# Patient Record
Sex: FEMALE | Race: WHITE
[De-identification: names, ages, dates, MRNs, and addresses within clinical notes are randomized per-mention and may not be internally consistent; named-entity substitution may affect disease eponyms.]

---

## 2019-02-10 NOTE — EDM.PDOC
ED HPI GENERAL MEDICAL PROBLEM





- General


Chief Complaint: Genitourinary Problem


Stated Complaint: BLADDER PROBLEMS


Time Seen by Provider: 02/10/19 12:38


Source of Information: Reports: Patient


History Limitations: Reports: No Limitations





- History of Present Illness


INITIAL COMMENTS - FREE TEXT/NARRATIVE: 


History of present illness:


[]Patient self catheterizes her bladder and has noticed that she is having less 

urine production, there is no color change or blood in her urine.. Patient also 

states she is drinking less fluids. Patient denies any abdominal pain, fevers, 

chills, vomiting or diarrhea. She states she has noticed she started having low 

back pain bilaterally yesterday.





Review of systems: 


As per history of present illness and below otherwise all systems reviewed and 

negative.





Past medical history: 


As per history of present illness and as reviewed below otherwise 

noncontributory.





Surgical history: 


As per history of present illness and as reviewed below otherwise 

noncontributory.





Social history: 


No reported history of drug or alcohol abuse.





Family history: 


As per history of present illness and as reviewed below otherwise 

noncontributory.





Physical exam:


General: Well developed, well nourished in NAD


HEENT: Atraumatic, normocephalic, pupils reactive, negative for conjunctival 

pallor or scleral icterus, mucous membranes moist, throat clear, neck supple, 

nontender, trachea midline.


Lungs: Clear to auscultation, breath sounds equal bilaterally, chest nontender.


Heart: S1S2, regular, negative for clicks, rubs, or JVD.


Abdomen: NABS, Soft, nondistended, nontender. Negative for masses or 

hepatosplenomegaly. Negative for costovertebral tenderness.


Pelvis: Stable nontender.


Genitourinary: Deferred.


Rectal: Deferred.


Extremities: Atraumatic, negative for cords or calf pain. Neurovascular 

unremarkable.


Neuro: Awake, alert, oriented. Cranial nerves II through XII unremarkable. 

Cerebellum unremarkable. Motor and sensory unremarkable throughout. Exam 

nonfocal.


Skin:warm and dry





Diagnostics:


Bladder scan shows 173 mmol, UA positive for UTI





Therapeutics:


None





ED Course:


Unremarkable





Impression: 


UTI





Prescriptions:


Bactrim





Plan:


Increase fluids take meds as directed follow-up with primary care.





Definitive disposition and diagnosis as appropriate pending reevaluation and 

review of above.





  ** Lower Back


Pain Score (Numeric/FACES): 4





- Related Data


 Allergies











Allergy/AdvReac Type Severity Reaction Status Date / Time


 


codeine Allergy  Nausea and Verified 02/10/19 13:15





   Vomiting  











Home Meds: 


 Home Meds





Carisoprodol 350 mg PO QID PRN 05/24/14 [History]


Diclofenac Sodium/Misoprostol [Diclofenac-Misoprost  Tb] 1 tab PO BID 05/ 24/14 [History]


Ketorolac Tromethamine [Acular] 1 drop EYERT QID 05/24/14 [History]


Moxifloxacin [Vigamox 0.5% Ophth Soln] 1 drop EYERT QID 05/24/14 [History]


Ramipril [Altace] 5 mg PO DAILY 05/24/14 [History]


prednisoLONE acetate [Pred Forte 1% Ophth Susp] 1 drop EYERT QID 05/24/14 [

History]


hydroCHLOROthiazide [Hydrochlorothiazide] 25 mg PO DAILY #30 tablet 05/25/14 [Rx

]


Sulfamethoxazole/Trimethoprim [Bactrim Ds Tablet] 1 each PO BID #20 tablet 02/10

/19 [Rx]











Past Medical History


Cardiovascular History: Reports: Blood Clots/VTE/DVT, Hypertension


Genitourinary History: Reports: Retention, Urinary





- Infectious Disease History


Infectious Disease History: Reports: Chicken Pox, Measles, Mumps





- Past Surgical History


Female  Surgical History: Reports: Hysterectomy





Social & Family History





- Family History


Family Medical History: Noncontributory





- Tobacco Use


Smoking Status *Q: Former Smoker


Used Tobacco, but Quit: Yes


Month/Year Tobacco Last Used: 2 years





- Recreational Drug Use


Recreational Drug Use: No





ED ROS GENERAL





- Review of Systems


Review Of Systems: ROS reveals no pertinent complaints other than HPI.





ED EXAM, RENAL/





- Physical Exam


Exam: See Below (The history of present illness)





Course





- Vital Signs


Last Recorded V/S: 


 Last Vital Signs











Temp  98.0 F   02/10/19 13:12


 


Pulse  51 L  02/10/19 13:12


 


Resp  16   02/10/19 13:12


 


BP  169/41 H  02/10/19 13:12


 


Pulse Ox  99   02/10/19 13:12














- Orders/Labs/Meds


Orders: 


 Active Orders 24 hr











 Category Date Time Status


 


 Bladder Scan [RC] ASDIRECTED Care  02/10/19 13:37 Active


 


 CULTURE URINE [RM] Routine Lab  02/10/19 14:00 Received











Labs: 


 Laboratory Tests











  02/10/19 Range/Units





  14:00 


 


Urine Color  YELLOW  


 


Urine Appearance  CLEAR  


 


Urine pH  5.5  (5.0-8.0)  


 


Ur Specific Gravity  1.015  (1.001-1.035)  


 


Urine Protein  NEGATIVE  (NEGATIVE)  mg/dL


 


Urine Glucose (UA)  NEGATIVE  (NEGATIVE)  mg/dL


 


Urine Ketones  NEGATIVE  (NEGATIVE)  mg/dL


 


Urine Occult Blood  NEGATIVE  (NEGATIVE)  


 


Urine Nitrite  POSITIVE H  (NEGATIVE)  


 


Urine Bilirubin  NEGATIVE  (NEGATIVE)  


 


Urine Urobilinogen  0.2  (<2.0)  EU/dL


 


Ur Leukocyte Esterase  SMALL H  (NEGATIVE)  


 


Urine RBC  1-3  (0-2/HPF)  


 


Urine WBC  4-16  (0-5/HPF)  


 


Ur Epithelial Cells  RARE  (NONE-FEW)  


 


Urine Bacteria  2+ H  (NEGATIVE)  


 


Urine Mucus  RARE  (NONE-MOD)  














Departure





- Departure


Time of Disposition: 14:23


Disposition: Home, Self-Care 01


Condition: Good


Clinical Impression: 


UTI (urinary tract infection)


Qualifiers:


 Urinary tract infection type: site unspecified Hematuria presence: without 

hematuria Qualified Code(s): N39.0 - Urinary tract infection, site not specified








- Discharge Information


*PRESCRIPTION DRUG MONITORING PROGRAM REVIEWED*: No


*COPY OF PRESCRIPTION DRUG MONITORING REPORT IN PATIENT RACHNA: No


Prescriptions: 


Sulfamethoxazole/Trimethoprim [Bactrim Ds Tablet] 1 each PO BID #20 tablet


Referrals: 


PCP,Unknown [Primary Care Provider] - 


Forms:  ED Department Discharge


Additional Instructions: 


The following information is given to patients seen in the emergency department 

who are being discharged to home. This information is to outline your options 

for follow-up care. We provide all patients seen in our emergency department 

with a follow-up referral.





The need for follow-up, as well as the timing and circumstances, are variable 

depending upon the specifics of your emergency department visit.





If you don't have a primary care physician on staff, we will provide you with a 

referral. We always advise you to contact your personal physician following an 

emergency department visit to inform them of the circumstance of the visit and 

for follow-up with them and/or the need for any referrals to a consulting 

specialist.





The emergency department will also refer you to a specialist when appropriate. 

This referral assures that you have the opportunity for follow-up care with a 

specialist. All of these measure are taken in an effort to provide you with 

optimal care, which includes your follow-up.





Under all circumstances we always encourage you to contact your private 

physician who remains a resource for coordinating your care. When calling for 

follow-up care, please make the office aware that this follow-up is from your 

recent emergency room visit. If for any reason you are refused follow-up, 

please contact the [facility] Emergency 

Department at [phone] and asked to speak to the emergency department 

charge nurse.





Take meds as directed, increase fluids and follow-up with primary care as 

needed.





[facility]


Primary Care


[address] [place] [id number]


Phone: [phone]


Fax: [phone]





- My Orders


Last 24 Hours: 


My Active Orders





02/10/19 13:37


Bladder Scan [RC] ASDIRECTED 





02/10/19 14:00


CULTURE URINE [RM] Routine 














- Assessment/Plan


Last 24 Hours: 


My Active Orders





02/10/19 13:37


Bladder Scan [RC] ASDIRECTED 





02/10/19 14:00


CULTURE URINE [RM] Routine

## 2019-03-23 NOTE — US
CLINICAL HISTORY:



 Right leg pain 



TECHNIQUE:



A compression venous ultrasound exam was performed of the right lower 

extremity using gray-scale imaging, color Doppler and spectral Doppler 

analysis.



FINDINGS:



Sonographic imaging of the right lower extremity demonstrates normal 

compressibility and color Doppler venous blood flow within the common 

femoral vein, deep femoral vein, and the proximal greater saphenous vein. 

Within the thigh,  the proximal mid femoral vein is patent and 

compressible. 



There is a short segment of the right distal femoral vein that is 

noncompressible and has suggestion of some fibrous band within the lumen. 

Duplicated distal femoral vein is present. 



At the lower level, the popliteal and posterior tibial veins also show 

normal compressibility and color Doppler venous blood flow.



Limited imaging of the contralateral groin demonstrates a normal spectral 

waveform and color Doppler venous blood flow within the left common femoral 

vein. 



IMPRESSION:



1. Short segment thrombus within the right distal femoral vein which 

suggestion of fibrous bands within the lumen. Findings suggest possible 

chronic DVT. 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 



There is otherwise no findings for thrombus within the remainder of the 

deep venous system within the right leg. Results called to Dr. Melgoza on 

03/23/2019 at 3:10 p.m.



Dictated by Whitley Stover MD @ Mar 23 2019  3:00PM



Signed by Dr. Whitley Stover @ Mar 23 2019  3:10PM

## 2019-03-23 NOTE — EDM.PDOC
ED HPI GENERAL MEDICAL PROBLEM





- General


Chief Complaint: Lower Extremity Injury/Pain


Stated Complaint: PAIN IN RT LEG


Time Seen by Provider: 03/23/19 13:15


Source of Information: Reports: Patient


History Limitations: Reports: No Limitations





- History of Present Illness


INITIAL COMMENTS - FREE TEXT/NARRATIVE: 





HISTORY AND PHYSICAL:





History of present illness:


Patient is a 78-year-old female here with complaint of right leg pain. Patient 

states that she started having some throbbing just below the right knee today. 

She reports she has "multiple stents all over her body" due to blood clots. She 

states it really only bothers her when she is healing her feet over the edge of 

the couch or bed. She normally uses a cane to ambulate and did walk in to ED 

putting full weight on the right side. She denies any injury or trauma to the 

leg. She denies chest pain, shortness of breath, fevers, chills, nausea, 

vomiting, abdominal pain. Patient is on plavix for previous DVTs. 





Review of systems: 


As per history of present illness and below otherwise all systems reviewed and 

negative.





Past medical history: 


As per history of present illness and as reviewed below otherwise 

noncontributory.





Surgical history: 


As per history of present illness and as reviewed below otherwise 

noncontributory.





Social history: 


No reported history of drug or alcohol abuse.





Family history: 


As per history of present illness and as reviewed below otherwise 

noncontributory.





Physical exam:


General: Patient sitting comfortably in no acute distress and nontoxic appearing


HEENT: Atraumatic, normocephalic, pupils reactive, negative for conjunctival 

pallor or scleral icterus, mucous membranes moist, throat clear, neck supple, 

nontender, trachea midline. No meningeal signs. 


Lungs: Clear to auscultation, breath sounds equal bilaterally, chest nontender.


Heart: S1S2, regular, negative for clicks, rubs, or overt murmur.


Abdomen: Soft, nondistended, nontender. Negative for masses or 

hepatosplenomegaly. Negative for costovertebral tenderness. No rigidity, rebound

, guarding.


Pelvis: Stable nontender.


Genitourinary: Deferred.


Rectal: Deferred.


Extremities: There is no erythema, warmth, swelling or pain to palpation of the 

leg. Atraumatic, negative for cords or calf pain. Neurovascular unremarkable.


Neuro: Awake, alert, oriented. Cranial nerves II through XII unremarkable. 

Cerebellum unremarkable. Motor and sensory unremarkable throughout. Exam 

nonfocal.





Notes: 





Diagnostics:


Right venous doppler US 





Therapeutics:


None 





Prescriptions:


None 





Impression: 


Chronic DVT right femoral vein 





Plan:


1. Continue current medications as prescribed


2. Follow up with primary care provider


3. Return to ED as needed as discussed 





Definitive disposition and diagnosis as appropriate pending reevaluation and 

review of above.





  ** Right leg


Pain Score (Numeric/FACES): 8





- Related Data


 Allergies











Allergy/AdvReac Type Severity Reaction Status Date / Time


 


codeine Allergy  Nausea and Verified 03/23/19 13:37





   Vomiting  











Home Meds: 


 Home Meds





Carisoprodol 350 mg PO QID PRN 05/24/14 [History]


Diclofenac Sodium/Misoprostol [Diclofenac-Misoprost  Tb] 1 tab PO BID 05/ 24/14 [History]


Ketorolac Tromethamine [Acular] 1 drop EYERT QID 05/24/14 [History]


Moxifloxacin [Vigamox 0.5% Ophth Soln] 1 drop EYERT QID 05/24/14 [History]


Ramipril [Altace] 5 mg PO DAILY 05/24/14 [History]


prednisoLONE acetate [Pred Forte 1% Ophth Susp] 1 drop EYERT QID 05/24/14 [

History]


hydroCHLOROthiazide [Hydrochlorothiazide] 25 mg PO DAILY #30 tablet 05/25/14 [Rx

]


Sulfamethoxazole/Trimethoprim [Bactrim Ds Tablet] 1 each PO BID #20 tablet 02/10

/19 [Rx]











Past Medical History


Cardiovascular History: Reports: Blood Clots/VTE/DVT, Hypertension


Genitourinary History: Reports: Retention, Urinary





- Infectious Disease History


Infectious Disease History: Reports: Chicken Pox, Measles, Mumps





- Past Surgical History


Female  Surgical History: Reports: Hysterectomy





Social & Family History





- Family History


Family Medical History: Noncontributory





Review of Systems





- Review of Systems


Review Of Systems: ROS reveals no pertinent complaints other than HPI.





ED EXAM, GENERAL





- Physical Exam


Exam: See Below (see dictation)





Course





- Vital Signs


Last Recorded V/S: 


 Last Vital Signs











Temp  96.4 F   03/23/19 13:38


 


Pulse  79   03/23/19 13:38


 


Resp  17   03/23/19 13:38


 


BP  129/50 L  03/23/19 13:38


 


Pulse Ox  98   03/23/19 13:38














Departure





- Departure


Time of Disposition: 15:19


Disposition: Home, Self-Care 01


Condition: Good


Clinical Impression: 


 Chronic deep vein thrombosis (DVT)








- Discharge Information


Referrals: 


Valerie Garcia MD [Primary Care Provider] - 


Forms:  ED Department Discharge


Additional Instructions: 


The following information is given to patients seen in the emergency department 

who are being discharged to home. This information is to outline your options 

for follow-up care. We provide all patients seen in our emergency department 

with a follow-up referral.





The need for follow-up, as well as the timing and circumstances, are variable 

depending upon the specifics of your emergency department visit.





If you don't have a primary care physician on staff, we will provide you with a 

referral. We always advise you to contact your personal physician following an 

emergency department visit to inform them of the circumstance of the visit and 

for follow-up with them and/or the need for any referrals to a consulting 

specialist.





The emergency department will also refer you to a specialist when appropriate. 

This referral assures that you have the opportunity for follow-up care with a 

specialist. All of these measure are taken in an effort to provide you with 

optimal care, which includes your follow-up.





Under all circumstances we always encourage you to contact your private 

physician who remains a resource for coordinating your care. When calling for 

follow-up care, please make the office aware that this follow-up is from your 

recent emergency room visit. If for any reason you are refused follow-up, 

please contact the [facility] Emergency 

Department at [phone] and asked to speak to the emergency department 

charge nurse.





[facility]


Primary Care


[address] [place] [address]


Phone: [phone]


Fax: [phone]





[address] [address]


Phone: [phone]


Fax: [phone]





1. Continue current medications as prescribed


2. Follow up with primary care provider


3. Return to ED as needed as discussed

## 2019-04-16 ENCOUNTER — HOSPITAL ENCOUNTER (OUTPATIENT)
Dept: HOSPITAL 56 - MW.ED | Age: 79
Setting detail: OBSERVATION
LOS: 1 days | Discharge: HOME HEALTH | End: 2019-04-17
Attending: INTERNAL MEDICINE | Admitting: INTERNAL MEDICINE
Payer: MEDICARE

## 2019-04-16 DIAGNOSIS — I12.9: ICD-10-CM

## 2019-04-16 DIAGNOSIS — R55: Primary | ICD-10-CM

## 2019-04-16 DIAGNOSIS — E86.0: ICD-10-CM

## 2019-04-16 DIAGNOSIS — N17.9: ICD-10-CM

## 2019-04-16 DIAGNOSIS — Z86.718: ICD-10-CM

## 2019-04-16 DIAGNOSIS — S09.90XA: ICD-10-CM

## 2019-04-16 DIAGNOSIS — E11.22: ICD-10-CM

## 2019-04-16 DIAGNOSIS — Z87.891: ICD-10-CM
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
## 2019-04-16 DIAGNOSIS — R33.9: ICD-10-CM

## 2019-04-16 DIAGNOSIS — Z79.01: ICD-10-CM

## 2019-04-16 DIAGNOSIS — N18.9: ICD-10-CM

## 2019-04-16 DIAGNOSIS — Z79.82: ICD-10-CM

## 2019-04-16 DIAGNOSIS — I48.2: ICD-10-CM

## 2019-04-16 DIAGNOSIS — Z79.899: ICD-10-CM

## 2019-04-16 DIAGNOSIS — D63.1: ICD-10-CM

## 2019-04-16 LAB
CHLORIDE SERPL-SCNC: 107 MMOL/L (ref 98–107)
SODIUM SERPL-SCNC: 140 MMOL/L (ref 136–145)

## 2019-04-16 PROCEDURE — 71045 X-RAY EXAM CHEST 1 VIEW: CPT

## 2019-04-16 PROCEDURE — 70450 CT HEAD/BRAIN W/O DYE: CPT

## 2019-04-16 PROCEDURE — 36415 COLL VENOUS BLD VENIPUNCTURE: CPT

## 2019-04-16 PROCEDURE — G0378 HOSPITAL OBSERVATION PER HR: HCPCS

## 2019-04-16 PROCEDURE — 85610 PROTHROMBIN TIME: CPT

## 2019-04-16 PROCEDURE — 80053 COMPREHEN METABOLIC PANEL: CPT

## 2019-04-16 PROCEDURE — 82550 ASSAY OF CK (CPK): CPT

## 2019-04-16 PROCEDURE — 85025 COMPLETE CBC W/AUTO DIFF WBC: CPT

## 2019-04-16 PROCEDURE — 96360 HYDRATION IV INFUSION INIT: CPT

## 2019-04-16 PROCEDURE — 99285 EMERGENCY DEPT VISIT HI MDM: CPT

## 2019-04-16 PROCEDURE — 85018 HEMOGLOBIN: CPT

## 2019-04-16 PROCEDURE — 72170 X-RAY EXAM OF PELVIS: CPT

## 2019-04-16 PROCEDURE — 80305 DRUG TEST PRSMV DIR OPT OBS: CPT

## 2019-04-16 PROCEDURE — G0390 TRAUMA RESPONS W/HOSP CRITI: HCPCS

## 2019-04-16 PROCEDURE — 93005 ELECTROCARDIOGRAM TRACING: CPT

## 2019-04-16 PROCEDURE — 83550 IRON BINDING TEST: CPT

## 2019-04-16 PROCEDURE — 82607 VITAMIN B-12: CPT

## 2019-04-16 PROCEDURE — 87186 SC STD MICRODIL/AGAR DIL: CPT

## 2019-04-16 PROCEDURE — 82746 ASSAY OF FOLIC ACID SERUM: CPT

## 2019-04-16 PROCEDURE — 82272 OCCULT BLD FECES 1-3 TESTS: CPT

## 2019-04-16 PROCEDURE — 85014 HEMATOCRIT: CPT

## 2019-04-16 PROCEDURE — 87088 URINE BACTERIA CULTURE: CPT

## 2019-04-16 PROCEDURE — 80048 BASIC METABOLIC PNL TOTAL CA: CPT

## 2019-04-16 PROCEDURE — 82728 ASSAY OF FERRITIN: CPT

## 2019-04-16 PROCEDURE — 87086 URINE CULTURE/COLONY COUNT: CPT

## 2019-04-16 PROCEDURE — 85045 AUTOMATED RETICULOCYTE COUNT: CPT

## 2019-04-16 PROCEDURE — 84484 ASSAY OF TROPONIN QUANT: CPT

## 2019-04-16 PROCEDURE — 51702 INSERT TEMP BLADDER CATH: CPT

## 2019-04-16 PROCEDURE — 81001 URINALYSIS AUTO W/SCOPE: CPT

## 2019-04-16 PROCEDURE — 96361 HYDRATE IV INFUSION ADD-ON: CPT

## 2019-04-16 PROCEDURE — 87040 BLOOD CULTURE FOR BACTERIA: CPT

## 2019-04-16 NOTE — CR
EXAMINATION: Portable chest radiograph.

 

HISTORY: Shortness of breath.

 

FINDINGS: 

The trachea is midline. The cardiomediastinal silhouette is within normal

limits. No pulmonary infiltrates, effusions or pneumothorax.

 

Osseous structures appear unremarkable.

 

IMPRESSION: 

No acute cardiopulmonary process.

## 2019-04-16 NOTE — CT
EXAMINATION:  Non contrast CT head. Coronal and sagittal reformats.

 

HISTORY: Pain

 

FINDINGS:  

No evidence of intra or extra axial hemorrhage, mass,  midline shift,

hydrocephalus or edema. Mild generalized atrophy. 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

lacunar infarct. Mild periventricular white matter hypodensities.

 

No hypoattenuation changes in the major vascular territories to suggest acute

infarct.  

 

No abnormal intracranial calcifications are detected. Mild vascular

calcifications.

 

Air-fluid levels are noted within the maxillary sinuses and right sphenoid

sinus. Left-sided estevan bullosa noted. Middle ears and mastoid air cells are

clear.

 

Pituitary fossa appears unremarkable. Orbits and globes are symmetric.

 

Calvarium is intact. No evidence of skull fracture. 

 

IMPRESSION: 

 

1. No acute intracranial findings.

2. Mild generalized atrophy and small vessel ischemic changes.

3. Air-fluid levels within the paranasal sinuses, correlate clinically for acute

sinusitis.

## 2019-04-16 NOTE — CR
EXAMINATION: Pelvis

 

HISTORY: Pain

 

COMPARISON: 8/2/2010

 

TECHNIQUE: AP view

 

FINDINGS: There is no acute osseous abnormality, dislocation, or fracture. Bone

mineralization is normal to mildly osteopenic. SI joints are symmetric with mild

osteophyte formation. Hip joint spaces are preserved. The iliopectineal and

ilioischial inguinal lines appear intact.

 

Postsurgical changes noted within the lower lumbar spine. Right common iliac

stent noted.

 

IMPRESSION: 

1. Degenerative changes and mild osteopenia without acute osseous abnormality.

## 2019-04-16 NOTE — EDM.PDOC
ED HPI GENERAL MEDICAL PROBLEM





- General


Stated Complaint: FALL


Time Seen by Provider: 04/16/19 13:38





- History of Present Illness


INITIAL COMMENTS - FREE TEXT/NARRATIVE: 


HISTORY AND PHYSICAL:





History of present illness:


Patient 78-year-old female with an extensive past medical history including 

reported atrial fibrillation was also on aspirin amongst other medications 

presents status post syncope in which he got up from the bathroom and lost 

consciousness she did hit her head she denies any other trauma concerned she 

denies chest pain palpitations fever chills nausea vomiting shortness of breath 

or any other complaints.





Review of systems: 


As per history of present illness and below otherwise all systems reviewed and 

negative.





Past medical history: 


As per history of present illness and as reviewed below otherwise 

noncontributory.





Surgical history: 


As per history of present illness and as reviewed below otherwise 

noncontributory.





Social history: 


No reported history of drug or alcohol abuse.





Family history: 


As per history of present illness and as reviewed below otherwise 

noncontributory.





Physical exam:


HEENT: Mild tenderness in the right occipital scalp no step-off no depression 

or crepitation a laceration normocephalic, pupils reactive, negative for 

conjunctival pallor or scleral icterus, mucous membranes moist, throat clear, 

neck supple, nontender, trachea midline.


Lungs: Clear to auscultation, breath sounds equal bilaterally, chest nontender.


Heart: S1S2, regular, negative for clicks, rubs, or JVD.


Abdomen: Soft, nondistended, nontender. Negative for masses or 

hepatosplenomegaly. Negative for costovertebral tenderness.


Pelvis: Stable nontender.


Genitourinary: Deferred.


Rectal: Deferred.


Extremities: Atraumatic, negative for cords or calf pain. Neurovascular 

unremarkable.


Neuro: Awake, alert, oriented. Follows commands and moves all extremities motor 

and sensory are grossly unremarkable limited but grossly nonfocal exam





Diagnostics:


CBC CMP troponin PT/INR chest x-ray EKG CT brain x-ray pelvis UA blood culture 

2 urine drug screen





Therapeutics:


IV O2 monitor





Impression: 


#1 syncope #2 fall with closed head trauma





Definitive disposition and diagnosis as appropriate pending reevaluation and 

review of above.








- Related Data


 Allergies











Allergy/AdvReac Type Severity Reaction Status Date / Time


 


codeine Allergy  Nausea and Verified 03/23/19 13:37





   Vomiting  











Home Meds: 


 Home Meds





Carisoprodol 350 mg PO QID PRN 05/24/14 [History]


Diclofenac Sodium/Misoprostol [Diclofenac-Misoprost  Tb] 1 tab PO BID 05/ 24/14 [History]


Ketorolac Tromethamine [Acular] 1 drop EYERT QID 05/24/14 [History]


Moxifloxacin [Vigamox 0.5% Ophth Soln] 1 drop EYERT QID 05/24/14 [History]


Ramipril [Altace] 5 mg PO DAILY 05/24/14 [History]


prednisoLONE acetate [Pred Forte 1% Ophth Susp] 1 drop EYERT QID 05/24/14 [

History]


hydroCHLOROthiazide [Hydrochlorothiazide] 25 mg PO DAILY #30 tablet 05/25/14 [Rx

]


Sulfamethoxazole/Trimethoprim [Bactrim Ds Tablet] 1 each PO BID #20 tablet 02/10

/19 [Rx]











Past Medical History





- Past Health History


Medical/Surgical History: Denies Medical/Surgical History


Cardiovascular History: Reports: Blood Clots/VTE/DVT, Hypertension


Genitourinary History: Reports: Retention, Urinary





- Infectious Disease History


Infectious Disease History: Reports: Chicken Pox, Measles, Mumps





- Past Surgical History


Female  Surgical History: Reports: Hysterectomy





Social & Family History





- Family History


Family Medical History: Noncontributory





- Caffeine Use


Caffeine Use: Reports: Soda, Tea





ED ROS GENERAL





- Review of Systems


Review Of Systems: ROS reveals no pertinent complaints other than HPI.





ED EXAM, GENERAL





- Physical Exam


Exam: See Below (See dictation)





Course





- Vital Signs


Last Recorded V/S: 


 Last Vital Signs











Temp  36.3 C   04/16/19 13:35


 


Pulse  80   04/16/19 15:00


 


Resp  16   04/16/19 15:00


 


BP  122/41 L  04/16/19 15:00


 


Pulse Ox  99   04/16/19 15:00














- Orders/Labs/Meds


Orders: 


 Active Orders 24 hr











 Category Date Time Status


 


 Admission Status [Patient Status] [ADT] Stat ADT  04/16/19 14:21 Active


 


 Cardiac Monitoring [RC] .AS DIRECTED Care  04/16/19 13:38 Active


 


 EKG Documentation Completion [RC] STAT Care  04/16/19 13:38 Active


 


 Oxygen Therapy, ED [RC] ASDIRECTED Care  04/16/19 13:38 Active


 


 Pulse Oximetry [RC] ASDIRECTED Care  04/16/19 13:38 Active


 


 CULTURE BLOOD [BC] Stat Lab  04/16/19 14:20 Received


 


 CULTURE BLOOD [BC] Stat Lab  04/16/19 14:28 Received


 


 CULTURE URINE [RM] Stat Lab  04/16/19 14:55 Received


 


 DRUG SCREEN, URINE [URCHEM] Stat Lab  04/16/19 14:55 Received


 


 UA W/MICROSCOPIC [URIN] Stat Lab  04/16/19 14:55 Results


 


 Sodium Chloride 0.9% [Normal Saline] 1,000 ml Med  04/16/19 13:45 Active





 IV STAT   


 


 Sodium Chloride 0.9% [Saline Flush] Med  04/16/19 13:41 Active





 10 ml FLUSH ASDIRECTED PRN   


 


 Sodium Chloride 0.9% [Saline Flush] Med  04/16/19 13:41 Active





 2.5 ml FLUSH ASDIRECTED PRN   


 


 Blood Culture x2 Reflex Set [OM.PC] Stat Oth  04/16/19 13:40 Ordered


 


 Saline Lock Insert [OM.PC] Stat Oth  04/16/19 13:38 Ordered








 Medication Orders





Sodium Chloride (Normal Saline)  1,000 mls @ 125 mls/hr IV STAT [facility]


   Last Admin: 04/16/19 14:10  Dose: 125 mls/hr


Sodium Chloride (Saline Flush)  10 ml FLUSH ASDIRECTED PRN


   PRN Reason: Keep Vein Open


   Last Admin: 04/16/19 14:10  Dose: 10 ml


Sodium Chloride (Saline Flush)  2.5 ml FLUSH ASDIRECTED PRN


   PRN Reason: Keep Vein Open


   Last Admin: 04/16/19 14:10  Dose: 2.5 ml








Labs: 


 Laboratory Tests











  04/16/19 04/16/19 04/16/19 Range/Units





  14:20 14:20 14:20 


 


WBC  11.97 H    (4.0-11.0)  K/uL


 


RBC  3.20 L    (4.30-5.90)  M/uL


 


Hgb  9.9 L    (12.0-16.0)  g/dL


 


Hct  30.6 L    (36.0-46.0)  %


 


MCV  95.6    (80.0-98.0)  fL


 


MCH  30.9    (27.0-32.0)  pg


 


MCHC  32.4    (31.0-37.0)  g/dL


 


RDW Std Deviation  52.0    (28.0-62.0)  fl


 


RDW Coeff of Charlee  15    (11.0-15.0)  %


 


Plt Count  427 H    (150-400)  K/uL


 


MPV  10.20    (7.40-12.00)  fL


 


Add Manual Diff  YES    


 


Neutrophils % (Manual)  77    (48.0-80.0)  %


 


Band Neutrophils %  6    %


 


Lymphocytes % (Manual)  9 L    (16.0-40.0)  %


 


Monocytes % (Manual)  5    (0.0-15.0)  %


 


Eosinophils % (Manual)  3    (0.0-7.0)  %


 


Nucleated RBC %  0.0    /100WBC


 


Absolute Seg Neuts  9.2 H    (1.4-5.7)  


 


Band Neutrophils #  0.7    


 


Lymphocytes # (Manual)  1.1    (0.6-2.4)  


 


Monocytes # (Manual)  0.6    (0.0-0.8)  


 


Eosinophils # (Manual)  0.4    (0.0-0.7)  


 


Nucleated RBCs #  0    K/uL


 


INR   1.27   


 


Sodium    140  (136-145)  mmol/L


 


Potassium    4.8  (3.5-5.1)  mmol/L


 


Chloride    107  ()  mmol/L


 


Carbon Dioxide    21.5  (21.0-32.0)  mmol/L


 


BUN    34 H  (7.0-18.0)  mg/dL


 


Creatinine    2.3 H  (0.6-1.0)  mg/dL


 


Est Cr Clr Drug Dosing    17.21  mL/min


 


Estimated GFR (MDRD)    20.6  ml/min


 


Glucose    125 H  ()  mg/dL


 


Calcium    9.7  (8.5-10.1)  mg/dL


 


Total Bilirubin    0.4  (0.2-1.0)  mg/dL


 


AST    23  (15-37)  IU/L


 


ALT    18  (14-63)  IU/L


 


Alkaline Phosphatase    48  ()  U/L


 


Troponin I    < 0.050  (0.000-0.056)  ng/mL


 


Total Protein    6.9  (6.4-8.2)  g/dL


 


Albumin    3.0 L  (3.4-5.0)  g/dL


 


Globulin    3.9  (2.6-4.0)  g/dL


 


Albumin/Globulin Ratio    0.8 L  (0.9-1.6)  


 


Urine Color     


 


Urine Appearance     


 


Urine pH     (5.0-8.0)  


 


Ur Specific Gravity     (1.001-1.035)  


 


Urine Protein     (NEGATIVE)  mg/dL


 


Urine Glucose (UA)     (NEGATIVE)  mg/dL


 


Urine Ketones     (NEGATIVE)  mg/dL


 


Urine Occult Blood     (NEGATIVE)  


 


Urine Nitrite     (NEGATIVE)  


 


Urine Bilirubin     (NEGATIVE)  


 


Urine Urobilinogen     (<2.0)  EU/dL


 


Ur Leukocyte Esterase     (NEGATIVE)  














  04/16/19 Range/Units





  14:55 


 


WBC   (4.0-11.0)  K/uL


 


RBC   (4.30-5.90)  M/uL


 


Hgb   (12.0-16.0)  g/dL


 


Hct   (36.0-46.0)  %


 


MCV   (80.0-98.0)  fL


 


MCH   (27.0-32.0)  pg


 


MCHC   (31.0-37.0)  g/dL


 


RDW Std Deviation   (28.0-62.0)  fl


 


RDW Coeff of Charlee   (11.0-15.0)  %


 


Plt Count   (150-400)  K/uL


 


MPV   (7.40-12.00)  fL


 


Add Manual Diff   


 


Neutrophils % (Manual)   (48.0-80.0)  %


 


Band Neutrophils %   %


 


Lymphocytes % (Manual)   (16.0-40.0)  %


 


Monocytes % (Manual)   (0.0-15.0)  %


 


Eosinophils % (Manual)   (0.0-7.0)  %


 


Nucleated RBC %   /100WBC


 


Absolute Seg Neuts   (1.4-5.7)  


 


Band Neutrophils #   


 


Lymphocytes # (Manual)   (0.6-2.4)  


 


Monocytes # (Manual)   (0.0-0.8)  


 


Eosinophils # (Manual)   (0.0-0.7)  


 


Nucleated RBCs #   K/uL


 


INR   


 


Sodium   (136-145)  mmol/L


 


Potassium   (3.5-5.1)  mmol/L


 


Chloride   ()  mmol/L


 


Carbon Dioxide   (21.0-32.0)  mmol/L


 


BUN   (7.0-18.0)  mg/dL


 


Creatinine   (0.6-1.0)  mg/dL


 


Est Cr Clr Drug Dosing   mL/min


 


Estimated GFR (MDRD)   ml/min


 


Glucose   ()  mg/dL


 


Calcium   (8.5-10.1)  mg/dL


 


Total Bilirubin   (0.2-1.0)  mg/dL


 


AST   (15-37)  IU/L


 


ALT   (14-63)  IU/L


 


Alkaline Phosphatase   ()  U/L


 


Troponin I   (0.000-0.056)  ng/mL


 


Total Protein   (6.4-8.2)  g/dL


 


Albumin   (3.4-5.0)  g/dL


 


Globulin   (2.6-4.0)  g/dL


 


Albumin/Globulin Ratio   (0.9-1.6)  


 


Urine Color  YELLOW  


 


Urine Appearance  CLEAR  


 


Urine pH  7.0  (5.0-8.0)  


 


Ur Specific Gravity  1.010  (1.001-1.035)  


 


Urine Protein  NEGATIVE  (NEGATIVE)  mg/dL


 


Urine Glucose (UA)  NEGATIVE  (NEGATIVE)  mg/dL


 


Urine Ketones  NEGATIVE  (NEGATIVE)  mg/dL


 


Urine Occult Blood  SMALL H  (NEGATIVE)  


 


Urine Nitrite  NEGATIVE  (NEGATIVE)  


 


Urine Bilirubin  NEGATIVE  (NEGATIVE)  


 


Urine Urobilinogen  0.2  (<2.0)  EU/dL


 


Ur Leukocyte Esterase  SMALL H  (NEGATIVE)  











Meds: 


Medications











Generic Name Dose Route Start Last Admin





  Trade Name Freq  PRN Reason Stop Dose Admin


 


Sodium Chloride  1,000 mls @ 125 mls/hr  04/16/19 13:45  04/16/19 14:10





  Normal Saline  IV   125 mls/hr





  STAT BALDO   Administration





     





     





     





     


 


Sodium Chloride  10 ml  04/16/19 13:41  04/16/19 14:10





  Saline Flush  FLUSH   10 ml





  ASDIRECTED PRN   Administration





  Keep Vein Open   





     





     





     


 


Sodium Chloride  2.5 ml  04/16/19 13:41  04/16/19 14:10





  Saline Flush  FLUSH   2.5 ml





  ASDIRECTED PRN   Administration





  Keep Vein Open   





     





     





     














Departure





- Departure


Time of Disposition: 15:13


Disposition: Refer to Observation


Condition: Good


Clinical Impression: 


 Syncope, Head injury








- Discharge Information





- My Orders


Last 24 Hours: 


My Active Orders





04/16/19 13:38


Cardiac Monitoring [RC] .AS DIRECTED 


EKG Documentation Completion [RC] STAT 


Oxygen Therapy, ED [RC] ASDIRECTED 


Pulse Oximetry [RC] ASDIRECTED 


Saline Lock Insert [OM.PC] Stat 





04/16/19 13:40


Blood Culture x2 Reflex Set [OM.PC] Stat 





04/16/19 13:41


Sodium Chloride 0.9% [Saline Flush]   10 ml FLUSH ASDIRECTED PRN 


Sodium Chloride 0.9% [Saline Flush]   2.5 ml FLUSH ASDIRECTED PRN 





04/16/19 13:45


Sodium Chloride 0.9% [Normal Saline] 1,000 ml IV STAT 





04/16/19 14:20


CULTURE BLOOD [BC] Stat 





04/16/19 14:21


Admission Status [Patient Status] [ADT] Stat 





04/16/19 14:28


CULTURE BLOOD [BC] Stat 





04/16/19 14:55


CULTURE URINE [RM] Stat 


DRUG SCREEN, URINE [URCHEM] Stat 


UA W/MICROSCOPIC [URIN] Stat 














- Assessment/Plan


Last 24 Hours: 


My Active Orders





04/16/19 13:38


Cardiac Monitoring [RC] .AS DIRECTED 


EKG Documentation Completion [RC] STAT 


Oxygen Therapy, ED [RC] ASDIRECTED 


Pulse Oximetry [RC] ASDIRECTED 


Saline Lock Insert [OM.PC] Stat 





04/16/19 13:40


Blood Culture x2 Reflex Set [OM.PC] Stat 





04/16/19 13:41


Sodium Chloride 0.9% [Saline Flush]   10 ml FLUSH ASDIRECTED PRN 


Sodium Chloride 0.9% [Saline Flush]   2.5 ml FLUSH ASDIRECTED PRN 





04/16/19 13:45


Sodium Chloride 0.9% [Normal Saline] 1,000 ml IV STAT 





04/16/19 14:20


CULTURE BLOOD [BC] Stat 





04/16/19 14:21


Admission Status [Patient Status] [ADT] Stat 





04/16/19 14:28


CULTURE BLOOD [BC] Stat 





04/16/19 14:55


CULTURE URINE [RM] Stat 


DRUG SCREEN, URINE [URCHEM] Stat 


UA W/MICROSCOPIC [URIN] Stat

## 2019-04-17 LAB
CHLORIDE SERPL-SCNC: 109 MMOL/L (ref 98–107)
SODIUM SERPL-SCNC: 140 MMOL/L (ref 136–145)

## 2019-04-17 NOTE — [id number]
Addendum entered and electronically signed by Roxy Zarate NP  04/17/19 16:51

: 








**Discharge Summary





- Hospital Course


Free Text/Narrative:: 





Marisa will be discharged home with Home Health. SHe is need of skilled care to 

help with medication administration as well as monitoring HR and BP. She would 

also benefit from PT and OT to evaluate home safety and to work with 

ambulationg and strengthening due to generalized weakness and deconditioning. 

She uses can and walker at home intermittently for ambulation and needs the 

assistance of these and care give to leave her house. Her PCP Dr Garcia will 

follow up with Plan of care with Home Health.  


Brief History: This 78 year old female with extensive history including Afib on 

anticoagulation, DVTs, HTN, and self caths presents to the ED via EMS after she 

laid in her bathtub overnight. She reports the last thing remembers was getting 

up to go to the bathroom. She reports she woke up in her bathtub and thinks she 

hit her head. She denies pain anywhere currently. She reports prior to this she 

was feeling well except for a little sinus congestion. She denies any headache 

or visual concerns, no lightheadedness or dizziness. She denies chest pain or 

palpitations. No shortness of breath or abdominal pain. She reports she is 

producing urine, she reports self cathing. She denies black or bloody BMs. No 

diarrhea or constipation. She lives alone, but her son comes to check on her.  

In the ED, mild leukocytosis noted 11,970, hgb 9.9, Platelets 427, BUN 34 and 

Cr 2.3. Troponin negative. Tox screen negative and UA neg. Head CT was obtained 

and reveals no acute intracranial findings, possible acute sinusitis. CXR 

negative and pelvix Xray negative. She will be admitted due to syncope.  PCP, 

Dr Garcia


Diagnosis: Stroke: No





- Discharge Data


Discharge Date: 04/17/19


Discharge Disposition: Home, W Home Health Agency 06


Condition: Good





- Discharge Diagnosis/Problem(s)


(1) Syncope


SNOMED Code(s): 996208760


   ICD Code: R55 - SYNCOPE AND COLLAPSE   Status: Acute   Current Visit: Yes   


Qualifiers: 


   Encounter type: initial encounter 





(2) Head injury


SNOMED Code(s): 48080956


   ICD Code: S09.90XA - UNSPECIFIED INJURY OF HEAD, INITIAL ENCOUNTER   Status: 

Acute   Current Visit: Yes   


Qualifiers: 


   Encounter type: initial encounter   Qualified Code(s): S09.90XA - 

Unspecified injury of head, initial encounter   





(3) HTN (hypertension)


SNOMED Code(s): 00293803


   ICD Code: I10 - ESSENTIAL (PRIMARY) HYPERTENSION   Status: Chronic   Current 

Visit: Yes   


Qualifiers: 


   Hypertension type: essential hypertension   Qualified Code(s): I10 - 

Essential (primary) hypertension   





(4) Afib


SNOMED Code(s): 32769394


   ICD Code: I48.91 - UNSPECIFIED ATRIAL FIBRILLATION   Status: Chronic   

Current Visit: Yes   





(5) Chronic anticoagulation


SNOMED Code(s): 947670145


   ICD Code: Z79.01 - LONG TERM (CURRENT) USE OF ANTICOAGULANTS   Status: 

Chronic   Current Visit: Yes   





(6) Urinary retention


SNOMED Code(s): 296737435


   ICD Code: R33.9 - RETENTION OF URINE, UNSPECIFIED   Status: Chronic   

Current Visit: Yes   





(7) Hx of deep venous thrombosis


SNOMED Code(s): 774151089


   ICD Code: Z86.718 - PERSONAL HISTORY OF OTHER VENOUS THROMBOSIS AND EMBOLISM

   Status: Chronic   Current Visit: Yes   





(8) Anemia


SNOMED Code(s): 991641237


   ICD Code: D64.9 - ANEMIA, UNSPECIFIED   Status: Acute   Current Visit: Yes   





(9) CKD (chronic kidney disease)


SNOMED Code(s): 830660168


   ICD Code: N18.9 - CHRONIC KIDNEY DISEASE, UNSPECIFIED   Status: Acute   

Current Visit: Yes   





- Patient Summary/Data


Consults: 


 Consultations





04/16/19 18:12


Consult to Physical Therapy [PT Evaluation and Treatment] [CONS] Routine 














- Patient Instructions


Diet: Heart Healthy Diet, Diabetic Diet


Activity: As Tolerated, No Strenuous Activities


Driving: Do Not Drive


Notify Provider of: Fever, Increased Pain, Swelling and Redness, Drainage, 

Nausea and/or Vomiting





- Discharge Plan


*PRESCRIPTION DRUG MONITORING PROGRAM REVIEWED*: Not Applicable


*COPY OF PRESCRIPTION DRUG MONITORING REPORT IN PATIENT RACHNA: Not Applicable


Prescriptions/Med Rec: 


Iron Ps Cmplx/Vit B12/Fa [Poly-Iron 150 Forte] 1 each PO DAILY #30 capsule


Home Medications: 


 Home Meds





Chlorthalidone 25 mg PO DAILY 04/16/19 [History]


Fenofibrate 145 mg PO DAILY 04/16/19 [History]


Metaxalone [Skelaxin] 800 mg PO BEDTIME 04/16/19 [History]


Metoprolol Succinate [Kapspargo Sprinkle] 25 mg PO DAILY 04/16/19 [History]


Potassium Chloride 10 meq PO DAILY 04/16/19 [History]


Ramipril [Altace] 5 mg PO DAILY 04/16/19 [History]


Rosuvastatin Calcium 20 mg PO DAILY 04/16/19 [History]


Zolpidem Tartrate [Edluar] 1 tab PO BEDTIME 04/16/19 [History]


Aspirin [Guilherme Chewable Aspirin] 81 mg PO DAILY #0 04/17/19 [Rx]


Iron Ps Cmplx/Vit B12/Fa [Poly-Iron 150 Forte] 1 each PO DAILY #30 capsule 04/17 /19 [Rx]








Oxygen Therapy Mode: Room Air


Patient Handouts:  Head Injury, Adult, Iron tablets, capsules, extended-release 

tablets, Hypertension, Easy-to-Read, Deep Vein Thrombosis, Syncope, Easy-to-Read


Referrals: 


Destiny Hook MD [Physician] - 05/09/19 1:00 pm


Valerie Garcia MD [Physician] - 05/02/19 9:00 am





- Discharge Summary/Plan Comment


DC Time >30 min.: No





- Patient Data


Vitals - Most Recent: 


 Last Vital Signs











Temp  98.1 F   04/17/19 16:00


 


Pulse  79   04/17/19 16:00


 


Resp  18   04/17/19 16:00


 


BP  140/46 L  04/17/19 16:00


 


Pulse Ox  99   04/17/19 16:00








 





Orthostatic Blood Pressure [     127/59


Standing]                        


Orthostatic Blood Pressure [     130/59


Sitting]                         


Orthostatic Blood Pressure [     115/54


Supine]                          








Weight - Most Recent: 71.1 kg


I&O - Last 24 hours: 


 Intake & Output











 04/17/19 04/17/19 04/17/19





 06:59 14:59 22:59


 


Intake Total 400 240 480


 


Output Total 600  900


 


Balance -200 240 -420











Lab Results - Last 24 hrs: 


 Laboratory Results - last 24 hr











  04/16/19 04/16/19 04/16/19 Range/Units





  14:20 14:20 14:20 


 


WBC     (4.0-11.0)  K/uL


 


RBC    3.01 L  (4.30-5.90)  M/uL


 


Hgb     (12.0-16.0)  g/dL


 


Hct     (36.0-46.0)  %


 


MCV     (80.0-98.0)  fL


 


MCH     (27.0-32.0)  pg


 


MCHC     (31.0-37.0)  g/dL


 


RDW Std Deviation     (28.0-62.0)  fl


 


RDW Coeff of Charlee     (11.0-15.0)  %


 


Plt Count     (150-400)  K/uL


 


MPV     (7.40-12.00)  fL


 


Add Manual Diff     


 


Neutrophils % (Manual)     (48.0-80.0)  %


 


Band Neutrophils %     %


 


Lymphocytes % (Manual)     (16.0-40.0)  %


 


Monocytes % (Manual)     (0.0-15.0)  %


 


Eosinophils % (Manual)     (0.0-7.0)  %


 


Basophils % (Manual)     (0.0-1.5)  %


 


Absolute Seg Neuts     (1.4-5.7)  


 


Band Neutrophils #     


 


Lymphocytes # (Manual)     (0.6-2.4)  


 


Monocytes # (Manual)     (0.0-0.8)  


 


Eosinophils # (Manual)     (0.0-0.7)  


 


Basophils # (Manual)     (0.0-0.1)  


 


Absolute Retic    57.20  (20-80)  K/uL


 


Percent Retic    1.9 H  (0.5-1.5)  %


 


Immature Retic Fraction    18  %


 


Sodium     (136-145)  mmol/L


 


Potassium     (3.5-5.1)  mmol/L


 


Chloride     ()  mmol/L


 


Carbon Dioxide     (21.0-32.0)  mmol/L


 


BUN     (7.0-18.0)  mg/dL


 


Creatinine     (0.6-1.0)  mg/dL


 


Est Cr Clr Drug Dosing     mL/min


 


Estimated GFR (MDRD)     ml/min


 


Glucose     ()  mg/dL


 


Calcium     (8.5-10.1)  mg/dL


 


Iron   47 L   ()  ug/dL


 


TIBC   340   (250-450)  ug/dL


 


% Saturation   13.82 L   (20-55)  %


 


Transferrin   238   (200-400)  ug/dL


 


Ferritin   169   (8-252)  ng/mL


 


Creatine Kinase  172    ()  U/L


 


Vitamin B12     (193-986)  pg/mL


 


Folate     (8.60-58.90)  ng/mL














  04/16/19 04/17/19 04/17/19 Range/Units





  14:20 04:50 04:50 


 


WBC   7.91   (4.0-11.0)  K/uL


 


RBC   2.57 L   (4.30-5.90)  M/uL


 


Hgb   8.1 L   (12.0-16.0)  g/dL


 


Hct   24.9 L   (36.0-46.0)  %


 


MCV   96.9   (80.0-98.0)  fL


 


MCH   31.5   (27.0-32.0)  pg


 


MCHC   32.5   (31.0-37.0)  g/dL


 


RDW Std Deviation   46.3   (28.0-62.0)  fl


 


RDW Coeff of Charlee   14   (11.0-15.0)  %


 


Plt Count   342   (150-400)  K/uL


 


MPV   10.60   (7.40-12.00)  fL


 


Add Manual Diff   YES   


 


Neutrophils % (Manual)   62   (48.0-80.0)  %


 


Band Neutrophils %   5   %


 


Lymphocytes % (Manual)   21   (16.0-40.0)  %


 


Monocytes % (Manual)   4   (0.0-15.0)  %


 


Eosinophils % (Manual)   7   (0.0-7.0)  %


 


Basophils % (Manual)   1   (0.0-1.5)  %


 


Absolute Seg Neuts   4.9   (1.4-5.7)  


 


Band Neutrophils #   0.4   


 


Lymphocytes # (Manual)   1.7   (0.6-2.4)  


 


Monocytes # (Manual)   0.3   (0.0-0.8)  


 


Eosinophils # (Manual)   0.6   (0.0-0.7)  


 


Basophils # (Manual)   0.1   (0.0-0.1)  


 


Absolute Retic     (20-80)  K/uL


 


Percent Retic     (0.5-1.5)  %


 


Immature Retic Fraction     %


 


Sodium    140  (136-145)  mmol/L


 


Potassium    4.5  (3.5-5.1)  mmol/L


 


Chloride    109 H  ()  mmol/L


 


Carbon Dioxide    20.4 L  (21.0-32.0)  mmol/L


 


BUN    25 H  (7.0-18.0)  mg/dL


 


Creatinine    1.9 H  (0.6-1.0)  mg/dL


 


Est Cr Clr Drug Dosing    19.01  mL/min


 


Estimated GFR (MDRD)    25.7  ml/min


 


Glucose    99  ()  mg/dL


 


Calcium    8.4 L  (8.5-10.1)  mg/dL


 


Iron     ()  ug/dL


 


TIBC     (250-450)  ug/dL


 


% Saturation     (20-55)  %


 


Transferrin     (200-400)  ug/dL


 


Ferritin     (8-252)  ng/mL


 


Creatine Kinase     ()  U/L


 


Vitamin B12  840    (193-986)  pg/mL


 


Folate  15.30    (8.60-58.90)  ng/mL














  04/17/19 Range/Units





  14:05 


 


WBC   (4.0-11.0)  K/uL


 


RBC   (4.30-5.90)  M/uL


 


Hgb  8.3 L  (12.0-16.0)  g/dL


 


Hct  25.9 L  (36.0-46.0)  %


 


MCV   (80.0-98.0)  fL


 


MCH   (27.0-32.0)  pg


 


MCHC   (31.0-37.0)  g/dL


 


RDW Std Deviation   (28.0-62.0)  fl


 


RDW Coeff of Charlee   (11.0-15.0)  %


 


Plt Count   (150-400)  K/uL


 


MPV   (7.40-12.00)  fL


 


Add Manual Diff   


 


Neutrophils % (Manual)   (48.0-80.0)  %


 


Band Neutrophils %   %


 


Lymphocytes % (Manual)   (16.0-40.0)  %


 


Monocytes % (Manual)   (0.0-15.0)  %


 


Eosinophils % (Manual)   (0.0-7.0)  %


 


Basophils % (Manual)   (0.0-1.5)  %


 


Absolute Seg Neuts   (1.4-5.7)  


 


Band Neutrophils #   


 


Lymphocytes # (Manual)   (0.6-2.4)  


 


Monocytes # (Manual)   (0.0-0.8)  


 


Eosinophils # (Manual)   (0.0-0.7)  


 


Basophils # (Manual)   (0.0-0.1)  


 


Absolute Retic   (20-80)  K/uL


 


Percent Retic   (0.5-1.5)  %


 


Immature Retic Fraction   %


 


Sodium   (136-145)  mmol/L


 


Potassium   (3.5-5.1)  mmol/L


 


Chloride   ()  mmol/L


 


Carbon Dioxide   (21.0-32.0)  mmol/L


 


BUN   (7.0-18.0)  mg/dL


 


Creatinine   (0.6-1.0)  mg/dL


 


Est Cr Clr Drug Dosing   mL/min


 


Estimated GFR (MDRD)   ml/min


 


Glucose   ()  mg/dL


 


Calcium   (8.5-10.1)  mg/dL


 


Iron   ()  ug/dL


 


TIBC   (250-450)  ug/dL


 


% Saturation   (20-55)  %


 


Transferrin   (200-400)  ug/dL


 


Ferritin   (8-252)  ng/mL


 


Creatine Kinase   ()  U/L


 


Vitamin B12   (193-986)  pg/mL


 


Folate   (8.60-58.90)  ng/mL











[facility] Results - Last 24 hrs: 


 Microbiology











 04/17/19 15:45 Stool Occult Blood ([facility]) - Final





 Stool / Feces    NEGATIVE OCCULT BLOOD


 


 04/16/19 14:28 Aerobic Blood Culture - Preliminary





 Blood - Venous - Lab Draw    NO GROWTH AFTER 1 DAY





 Anaerobic Blood Culture - Preliminary





    NO GROWTH AFTER 1 DAY


 


 04/16/19 14:20 Aerobic Blood Culture - Preliminary





 Blood - Venous    NO GROWTH AFTER 1 DAY





 Anaerobic Blood Culture - Preliminary





    NO GROWTH AFTER 1 DAY











Med Orders - Current: 


 Current Medications





Acetaminophen (Tylenol)  650 mg PO Q4H PRN


   PRN Reason: Pain (mild 1-3)


Aspirin (Aspirin)  81 mg PO DAILY [facility]


   Last Admin: 04/17/19 09:05 Dose:  81 mg


Fluticasone Propionate (Flonase)  0 gm NASBOTH DAILY [facility]


   Last Admin: 04/17/19 09:14 Dose:  1 spray


Metoprolol Succinate (Toprol Xl)  25 mg PO DAILY [facility]


   Last Admin: 04/17/19 09:05 Dose:  25 mg


Ondansetron HCl (Zofran)  4 mg IVPUSH Q4H PRN


   PRN Reason: Nausea


Sodium Chloride (Saline Flush)  10 ml FLUSH ASDIRECTED PRN


   PRN Reason: Keep Vein Open


   Last Admin: 04/16/19 14:10 Dose:  10 ml


Sodium Chloride (Saline Flush)  2.5 ml FLUSH ASDIRECTED PRN


   PRN Reason: Keep Vein Open


   Last Admin: 04/16/19 14:10 Dose:  2.5 ml


Zaleplon (Sonata)  5 mg PO BEDTIME [facility]


   Last Admin: 04/16/19 21:49 Dose:  5 mg





Discontinued Medications





Apixaban (Eliquis)  10 mg PO BID [facility]


   Last Admin: 04/17/19 09:04 Dose:  10 mg


Sodium Chloride (Normal Saline)  1,000 mls @ 125 mls/hr IV STAT [facility]


   Last Admin: 04/16/19 14:10 Dose:  125 mls/hr


Sodium Chloride (Normal Saline)  1,000 mls @ 100 mls/hr IV ASDIRECTED [facility]


   Last Admin: 04/17/19 06:45 Dose:  100 mls/hr











Original Note:








<Roxy Zarate M - Last Filed: 04/17/19 16:50>





**Discharge Summary





- Hospital Course


Brief History: This 78 year old female with extensive history including Afib on 

anticoagulation, DVTs, HTN, and self caths presents to the ED via EMS after she 

laid in her bathtub overnight. She reports the last thing remembers was getting 

up to go to the bathroom. She reports she woke up in her bathtub and thinks she 

hit her head. She denies pain anywhere currently. She reports prior to this she 

was feeling well except for a little sinus congestion. She denies any headache 

or visual concerns, no lightheadedness or dizziness. She denies chest pain or 

palpitations. No shortness of breath or abdominal pain. She reports she is 

producing urine, she reports self cathing. She denies black or bloody BMs. No 

diarrhea or constipation. She lives alone, but her son comes to check on her.  

In the ED, mild leukocytosis noted 11,970, hgb 9.9, Platelets 427, BUN 34 and 

Cr 2.3. Troponin negative. Tox screen negative and UA neg. Head CT was obtained 

and reveals no acute intracranial findings, possible acute sinusitis. CXR 

negative and pelvix Xray negative. She will be admitted due to syncope.  PCP, 

Dr Garcia


Diagnosis: Stroke: No





- Discharge Data


Discharge Date: 04/17/19


Discharge Disposition: Home, W Home Health Agency 06


Condition: Good





- Discharge Diagnosis/Problem(s)


(1) Syncope


SNOMED Code(s): 826713247


   ICD Code: R55 - SYNCOPE AND COLLAPSE   Status: Acute   Current Visit: Yes   


Qualifiers: 


   Encounter type: initial encounter 





(2) Head injury


SNOMED Code(s): 12513542


   ICD Code: S09.90XA - UNSPECIFIED INJURY OF HEAD, INITIAL ENCOUNTER   Status: 

Acute   Current Visit: Yes   


Qualifiers: 


   Encounter type: initial encounter   Qualified Code(s): S09.90XA - 

Unspecified injury of head, initial encounter   





(3) HTN (hypertension)


SNOMED Code(s): 66626466


   ICD Code: I10 - ESSENTIAL (PRIMARY) HYPERTENSION   Status: Chronic   Current 

Visit: Yes   


Qualifiers: 


   Hypertension type: essential hypertension   Qualified Code(s): I10 - 

Essential (primary) hypertension   





(4) Afib


SNOMED Code(s): 88844165


   ICD Code: I48.91 - UNSPECIFIED ATRIAL FIBRILLATION   Status: Chronic   

Current Visit: Yes   





(5) Chronic anticoagulation


SNOMED Code(s): 534992974


   ICD Code: Z79.01 - LONG TERM (CURRENT) USE OF ANTICOAGULANTS   Status: 

Chronic   Current Visit: Yes   





(6) Urinary retention


SNOMED Code(s): 284788063


   ICD Code: R33.9 - RETENTION OF URINE, UNSPECIFIED   Status: Chronic   

Current Visit: Yes   





(7) Hx of deep venous thrombosis


SNOMED Code(s): 545864283


   ICD Code: Z86.718 - PERSONAL HISTORY OF OTHER VENOUS THROMBOSIS AND EMBOLISM

   Status: Chronic   Current Visit: Yes   





(8) Anemia


SNOMED Code(s): 766636150


   ICD Code: D64.9 - ANEMIA, UNSPECIFIED   Status: Acute   Current Visit: Yes   





(9) CKD (chronic kidney disease)


SNOMED Code(s): 569680388


   ICD Code: N18.9 - CHRONIC KIDNEY DISEASE, UNSPECIFIED   Status: Acute   

Current Visit: Yes   





- Patient Summary/Data


Consults: 


 Consultations





04/16/19 18:12


Consult to Physical Therapy [PT Evaluation and Treatment] [CONS] Routine 














- Patient Instructions


Diet: Heart Healthy Diet, Diabetic Diet


Activity: As Tolerated, No Strenuous Activities


Driving: Do Not Drive


Notify Provider of: Fever, Increased Pain, Swelling and Redness, Drainage, 

Nausea and/or Vomiting





- Discharge Plan


*PRESCRIPTION DRUG MONITORING PROGRAM REVIEWED*: Not Applicable


*COPY OF PRESCRIPTION DRUG MONITORING REPORT IN PATIENT RACHNA: Not Applicable


Prescriptions/Med Rec: 


Iron Ps Cmplx/Vit B12/Fa [Poly-Iron 150 Forte] 1 each PO DAILY #30 capsule


Home Medications: 


 Home Meds





Chlorthalidone 25 mg PO DAILY 04/16/19 [History]


Fenofibrate 145 mg PO DAILY 04/16/19 [History]


Metaxalone [Skelaxin] 800 mg PO BEDTIME 04/16/19 [History]


Metoprolol Succinate [Kapspargo Sprinkle] 25 mg PO DAILY 04/16/19 [History]


Potassium Chloride 10 meq PO DAILY 04/16/19 [History]


Ramipril [Altace] 5 mg PO DAILY 04/16/19 [History]


Rosuvastatin Calcium 20 mg PO DAILY 04/16/19 [History]


Zolpidem Tartrate [Edluar] 1 tab PO BEDTIME 04/16/19 [History]


Aspirin [Guilherme Chewable Aspirin] 81 mg PO DAILY #0 04/17/19 [Rx]


Iron Ps Cmplx/Vit B12/Fa [Poly-Iron 150 Forte] 1 each PO DAILY #30 capsule 04/17 /19 [Rx]








Oxygen Therapy Mode: Room Air


Patient Handouts:  Head Injury, Adult, Iron tablets, capsules, extended-release 

tablets, Hypertension, Easy-to-Read, Deep Vein Thrombosis, Syncope, Easy-to-Read


Referrals: 


Destiny Hook MD [Physician] - 05/09/19 1:00 pm


Valerie Garcia MD [Physician] - 05/02/19 9:00 am





- Discharge Summary/Plan Comment


DC Time >30 min.: No


Discharge Summary/Plan Comment: 





Discharge Diagnoses:





Syncope- resolved


Anemia


HTN


DM Type 2


Urinary retention


Hx DVT


 CKD





Marisa was admitted secondary to syncopal episode. During her stay she was 

monitored for neuro changes secondary to fall and hitting head. No changes and 

CT negative. Anemia was noted on arrival, last hgb in 12/31/18 was 12 mg/dl, in 

ED it wsa 9.9. Mild dehdyration noted due to AUGUSTINA on CKD. Cr elevated from 

baseline. She was treated with IVFs overnight. BUN decreased to 1.9, normal 

1.5. Orthostatics were negative. No arrhythmias noted on telemetry. I spoke 

with Dr Hook regarding drop in hgb and syncope. Hemoccult was obtained 

and negative. I discussed holding Eliquis until further evaluation by Dr Garcia and Dr Hook and he was good with this. I educated Marisa on 

holding Eliquis until repeat hgb with Dr Garcia. She verbalized understanding. 

She is requesting discharge home now, she does not want to stay overnight 

again. She continues to deny dizziness or lightheadedness and no chest pain. 

She is to return to ED or clinic if concerns should arise. Son at bedside as 

well and wants her to be discharged. 





- General Info


Date of Service: 04/17/19


Admission Dx/Problem (Free Text: 


 Admission Diagnosis/Problem





Admission Diagnosis/Problem      Syncope





Subjective Update: 





Sitting on edge of bed, reports she is doing well. No pain and asking for 

discharge paperwork. No lightheadedness of dizziness.





- Review of Systems


General: Reports: No Symptoms


HEENT: Reports: No Symptoms


Pulmonary: Denies: Shortness of Breath, Cough, Sputum


Cardiovascular: Reports: No Symptoms.  Denies: Chest Pain


Gastrointestinal: Reports: No Symptoms.  Denies: Abdominal Pain, Melena, Nausea

, Vomiting


Genitourinary: Reports: No Symptoms.  Denies: Dysuria, Frequency, Burning


Musculoskeletal: Reports: No Symptoms


Neurological: Reports: No Symptoms


Psychiatric: Reports: No Symptoms





- Patient Data


Vitals - Most Recent: 


 Last Vital Signs











Temp  97.8 F   04/17/19 12:00


 


Pulse  79   04/17/19 12:00


 


Resp  20   04/17/19 12:00


 


BP  124/65   04/17/19 13:00


 


Pulse Ox  100   04/17/19 12:00








 





Orthostatic Blood Pressure [     127/59


Standing]                        


Orthostatic Blood Pressure [     130/59


Sitting]                         


Orthostatic Blood Pressure [     115/54


Supine]                          








Weight - Most Recent: 71.1 kg


I&O - Last 24 hours: 


 Intake & Output











 04/17/19 04/17/19 04/17/19





 06:59 14:59 22:59


 


Intake Total 400 240 


 


Output Total 600  


 


Balance -200 240 











Lab Results - Last 24 hrs: 


 Laboratory Results - last 24 hr











  04/16/19 04/16/19 04/16/19 Range/Units





  14:20 14:20 14:20 


 


WBC     (4.0-11.0)  K/uL


 


RBC    3.01 L  (4.30-5.90)  M/uL


 


Hgb     (12.0-16.0)  g/dL


 


Hct     (36.0-46.0)  %


 


MCV     (80.0-98.0)  fL


 


MCH     (27.0-32.0)  pg


 


MCHC     (31.0-37.0)  g/dL


 


RDW Std Deviation     (28.0-62.0)  fl


 


RDW Coeff of Charlee     (11.0-15.0)  %


 


Plt Count     (150-400)  K/uL


 


MPV     (7.40-12.00)  fL


 


Add Manual Diff     


 


Neutrophils % (Manual)     (48.0-80.0)  %


 


Band Neutrophils %     %


 


Lymphocytes % (Manual)     (16.0-40.0)  %


 


Monocytes % (Manual)     (0.0-15.0)  %


 


Eosinophils % (Manual)     (0.0-7.0)  %


 


Basophils % (Manual)     (0.0-1.5)  %


 


Absolute Seg Neuts     (1.4-5.7)  


 


Band Neutrophils #     


 


Lymphocytes # (Manual)     (0.6-2.4)  


 


Monocytes # (Manual)     (0.0-0.8)  


 


Eosinophils # (Manual)     (0.0-0.7)  


 


Basophils # (Manual)     (0.0-0.1)  


 


Absolute Retic    57.20  (20-80)  K/uL


 


Percent Retic    1.9 H  (0.5-1.5)  %


 


Immature Retic Fraction    18  %


 


Sodium     (136-145)  mmol/L


 


Potassium     (3.5-5.1)  mmol/L


 


Chloride     ()  mmol/L


 


Carbon Dioxide     (21.0-32.0)  mmol/L


 


BUN     (7.0-18.0)  mg/dL


 


Creatinine     (0.6-1.0)  mg/dL


 


Est Cr Clr Drug Dosing     mL/min


 


Estimated GFR (MDRD)     ml/min


 


Glucose     ()  mg/dL


 


Calcium     (8.5-10.1)  mg/dL


 


Iron   47 L   ()  ug/dL


 


TIBC   340   (250-450)  ug/dL


 


% Saturation   13.82 L   (20-55)  %


 


Transferrin   238   (200-400)  ug/dL


 


Ferritin   169   (8-252)  ng/mL


 


Creatine Kinase  172    ()  U/L


 


Vitamin B12     (193-986)  pg/mL


 


Folate     (8.60-58.90)  ng/mL














  04/16/19 04/17/19 04/17/19 Range/Units





  14:20 04:50 04:50 


 


WBC   7.91   (4.0-11.0)  K/uL


 


RBC   2.57 L   (4.30-5.90)  M/uL


 


Hgb   8.1 L   (12.0-16.0)  g/dL


 


Hct   24.9 L   (36.0-46.0)  %


 


MCV   96.9   (80.0-98.0)  fL


 


MCH   31.5   (27.0-32.0)  pg


 


MCHC   32.5   (31.0-37.0)  g/dL


 


RDW Std Deviation   46.3   (28.0-62.0)  fl


 


RDW Coeff of Charlee   14   (11.0-15.0)  %


 


Plt Count   342   (150-400)  K/uL


 


MPV   10.60   (7.40-12.00)  fL


 


Add Manual Diff   YES   


 


Neutrophils % (Manual)   62   (48.0-80.0)  %


 


Band Neutrophils %   5   %


 


Lymphocytes % (Manual)   21   (16.0-40.0)  %


 


Monocytes % (Manual)   4   (0.0-15.0)  %


 


Eosinophils % (Manual)   7   (0.0-7.0)  %


 


Basophils % (Manual)   1   (0.0-1.5)  %


 


Absolute Seg Neuts   4.9   (1.4-5.7)  


 


Band Neutrophils #   0.4   


 


Lymphocytes # (Manual)   1.7   (0.6-2.4)  


 


Monocytes # (Manual)   0.3   (0.0-0.8)  


 


Eosinophils # (Manual)   0.6   (0.0-0.7)  


 


Basophils # (Manual)   0.1   (0.0-0.1)  


 


Absolute Retic     (20-80)  K/uL


 


Percent Retic     (0.5-1.5)  %


 


Immature Retic Fraction     %


 


Sodium    140  (136-145)  mmol/L


 


Potassium    4.5  (3.5-5.1)  mmol/L


 


Chloride    109 H  ()  mmol/L


 


Carbon Dioxide    20.4 L  (21.0-32.0)  mmol/L


 


BUN    25 H  (7.0-18.0)  mg/dL


 


Creatinine    1.9 H  (0.6-1.0)  mg/dL


 


Est Cr Clr Drug Dosing    19.01  mL/min


 


Estimated GFR (MDRD)    25.7  ml/min


 


Glucose    99  ()  mg/dL


 


Calcium    8.4 L  (8.5-10.1)  mg/dL


 


Iron     ()  ug/dL


 


TIBC     (250-450)  ug/dL


 


% Saturation     (20-55)  %


 


Transferrin     (200-400)  ug/dL


 


Ferritin     (8-252)  ng/mL


 


Creatine Kinase     ()  U/L


 


Vitamin B12  840    (193-986)  pg/mL


 


Folate  15.30    (8.60-58.90)  ng/mL














  04/17/19 Range/Units





  14:05 


 


WBC   (4.0-11.0)  K/uL


 


RBC   (4.30-5.90)  M/uL


 


Hgb  8.3 L  (12.0-16.0)  g/dL


 


Hct  25.9 L  (36.0-46.0)  %


 


MCV   (80.0-98.0)  fL


 


MCH   (27.0-32.0)  pg


 


MCHC   (31.0-37.0)  g/dL


 


RDW Std Deviation   (28.0-62.0)  fl


 


RDW Coeff of Charlee   (11.0-15.0)  %


 


Plt Count   (150-400)  K/uL


 


MPV   (7.40-12.00)  fL


 


Add Manual Diff   


 


Neutrophils % (Manual)   (48.0-80.0)  %


 


Band Neutrophils %   %


 


Lymphocytes % (Manual)   (16.0-40.0)  %


 


Monocytes % (Manual)   (0.0-15.0)  %


 


Eosinophils % (Manual)   (0.0-7.0)  %


 


Basophils % (Manual)   (0.0-1.5)  %


 


Absolute Seg Neuts   (1.4-5.7)  


 


Band Neutrophils #   


 


Lymphocytes # (Manual)   (0.6-2.4)  


 


Monocytes # (Manual)   (0.0-0.8)  


 


Eosinophils # (Manual)   (0.0-0.7)  


 


Basophils # (Manual)   (0.0-0.1)  


 


Absolute Retic   (20-80)  K/uL


 


Percent Retic   (0.5-1.5)  %


 


Immature Retic Fraction   %


 


Sodium   (136-145)  mmol/L


 


Potassium   (3.5-5.1)  mmol/L


 


Chloride   ()  mmol/L


 


Carbon Dioxide   (21.0-32.0)  mmol/L


 


BUN   (7.0-18.0)  mg/dL


 


Creatinine   (0.6-1.0)  mg/dL


 


Est Cr Clr Drug Dosing   mL/min


 


Estimated GFR (MDRD)   ml/min


 


Glucose   ()  mg/dL


 


Calcium   (8.5-10.1)  mg/dL


 


Iron   ()  ug/dL


 


TIBC   (250-450)  ug/dL


 


% Saturation   (20-55)  %


 


Transferrin   (200-400)  ug/dL


 


Ferritin   (8-252)  ng/mL


 


Creatine Kinase   ()  U/L


 


Vitamin B12   (193-986)  pg/mL


 


Folate   (8.60-58.90)  ng/mL











[facility] Results - Last 24 hrs: 


 Microbiology











 04/16/19 14:28 Aerobic Blood Culture - Preliminary





 Blood - Venous - Lab Draw    NO GROWTH AFTER 1 DAY





 Anaerobic Blood Culture - Preliminary





    NO GROWTH AFTER 1 DAY


 


 04/16/19 14:20 Aerobic Blood Culture - Preliminary





 Blood - Venous    NO GROWTH AFTER 1 DAY





 Anaerobic Blood Culture - Preliminary





    NO GROWTH AFTER 1 DAY











Med Orders - Current: 


 Current Medications





Acetaminophen (Tylenol)  650 mg PO Q4H PRN


   PRN Reason: Pain (mild 1-3)


Aspirin (Aspirin)  81 mg PO DAILY [facility]


   Last Admin: 04/17/19 09:05 Dose:  81 mg


Fluticasone Propionate (Flonase)  0 gm NASBOTH DAILY [facility]


   Last Admin: 04/17/19 09:14 Dose:  1 spray


Metoprolol Succinate (Toprol Xl)  25 mg PO DAILY [facility]


   Last Admin: 04/17/19 09:05 Dose:  25 mg


Ondansetron HCl (Zofran)  4 mg IVPUSH Q4H PRN


   PRN Reason: Nausea


Sodium Chloride (Saline Flush)  10 ml FLUSH ASDIRECTED PRN


   PRN Reason: Keep Vein Open


   Last Admin: 04/16/19 14:10 Dose:  10 ml


Sodium Chloride (Saline Flush)  2.5 ml FLUSH ASDIRECTED PRN


   PRN Reason: Keep Vein Open


   Last Admin: 04/16/19 14:10 Dose:  2.5 ml


Zaleplon (Sonata)  5 mg PO BEDTIME [facility]


   Last Admin: 04/16/19 21:49 Dose:  5 mg





Discontinued Medications





Apixaban (Eliquis)  10 mg PO BID [facility]


   Last Admin: 04/17/19 09:04 Dose:  10 mg


Sodium Chloride (Normal Saline)  1,000 mls @ 125 mls/hr IV STAT [facility]


   Last Admin: 04/16/19 14:10 Dose:  125 mls/hr


Sodium Chloride (Normal Saline)  1,000 mls @ 100 mls/hr IV ASDIRECTED [facility]


   Last Admin: 04/17/19 06:45 Dose:  100 mls/hr











- Exam


General: Reports: Alert, Oriented, Cooperative, No Acute Distress


Lungs: Reports: Clear to Auscultation, Normal Respiratory Effort


Cardiovascular: Reports: Regular Rate, Regular Rhythm


GI/Abdominal Exam: Normal Bowel Sounds, Soft, Non-Tender


Extremities: Normal Inspection, Normal Range of Motion, Non-Tender, No Pedal 

Edema


Neurological: Reports: No New Focal Deficit


Psy/Mental Status: Reports: Alert, Normal Affect, Normal Mood





<Joseluis Mane M - Last Filed: 04/17/19 16:57>





**Discharge Summary





- Hospital Course


HPI Initial Comments: 





I have seen and examined the patient independently of Roxy Zarate CNP. I have 

discussed the case with her. I have reviewed and agreed with the plan of 

treatment as outlined for this patient by her. Please see orders.





- Patient Summary/Data


Consults: 


 Consultations





04/16/19 18:12


Consult to Physical Therapy [PT Evaluation and Treatment] [CONS] Routine 














- Patient Data


Vitals - Most Recent: 


 Last Vital Signs











Temp  36.7 C   04/17/19 16:00


 


Pulse  79   04/17/19 16:00


 


Resp  18   04/17/19 16:00


 


BP  140/46 L  04/17/19 16:00


 


Pulse Ox  99   04/17/19 16:00








 





Orthostatic Blood Pressure [     127/59


Standing]                        


Orthostatic Blood Pressure [     130/59


Sitting]                         


Orthostatic Blood Pressure [     115/54


Supine]                          








I&O - Last 24 hours: 


 Intake & Output











 04/17/19 04/17/19 04/17/19





 06:59 14:59 22:59


 


Intake Total 400 240 480


 


Output Total 600  900


 


Balance -200 240 -420











Lab Results - Last 24 hrs: 


 Laboratory Results - last 24 hr











  04/16/19 04/16/19 04/16/19 Range/Units





  14:20 14:20 14:20 


 


WBC     (4.0-11.0)  K/uL


 


RBC    3.01 L  (4.30-5.90)  M/uL


 


Hgb     (12.0-16.0)  g/dL


 


Hct     (36.0-46.0)  %


 


MCV     (80.0-98.0)  fL


 


MCH     (27.0-32.0)  pg


 


MCHC     (31.0-37.0)  g/dL


 


RDW Std Deviation     (28.0-62.0)  fl


 


RDW Coeff of Charlee     (11.0-15.0)  %


 


Plt Count     (150-400)  K/uL


 


MPV     (7.40-12.00)  fL


 


Add Manual Diff     


 


Neutrophils % (Manual)     (48.0-80.0)  %


 


Band Neutrophils %     %


 


Lymphocytes % (Manual)     (16.0-40.0)  %


 


Monocytes % (Manual)     (0.0-15.0)  %


 


Eosinophils % (Manual)     (0.0-7.0)  %


 


Basophils % (Manual)     (0.0-1.5)  %


 


Absolute Seg Neuts     (1.4-5.7)  


 


Band Neutrophils #     


 


Lymphocytes # (Manual)     (0.6-2.4)  


 


Monocytes # (Manual)     (0.0-0.8)  


 


Eosinophils # (Manual)     (0.0-0.7)  


 


Basophils # (Manual)     (0.0-0.1)  


 


Absolute Retic    57.20  (20-80)  K/uL


 


Percent Retic    1.9 H  (0.5-1.5)  %


 


Immature Retic Fraction    18  %


 


Sodium     (136-145)  mmol/L


 


Potassium     (3.5-5.1)  mmol/L


 


Chloride     ()  mmol/L


 


Carbon Dioxide     (21.0-32.0)  mmol/L


 


BUN     (7.0-18.0)  mg/dL


 


Creatinine     (0.6-1.0)  mg/dL


 


Est Cr Clr Drug Dosing     mL/min


 


Estimated GFR (MDRD)     ml/min


 


Glucose     ()  mg/dL


 


Calcium     (8.5-10.1)  mg/dL


 


Iron   47 L   ()  ug/dL


 


TIBC   340   (250-450)  ug/dL


 


% Saturation   13.82 L   (20-55)  %


 


Transferrin   238   (200-400)  ug/dL


 


Ferritin   169   (8-252)  ng/mL


 


Creatine Kinase  172    ()  U/L


 


Vitamin B12     (193-986)  pg/mL


 


Folate     (8.60-58.90)  ng/mL














  04/16/19 04/17/19 04/17/19 Range/Units





  14:20 04:50 04:50 


 


WBC   7.91   (4.0-11.0)  K/uL


 


RBC   2.57 L   (4.30-5.90)  M/uL


 


Hgb   8.1 L   (12.0-16.0)  g/dL


 


Hct   24.9 L   (36.0-46.0)  %


 


MCV   96.9   (80.0-98.0)  fL


 


MCH   31.5   (27.0-32.0)  pg


 


MCHC   32.5   (31.0-37.0)  g/dL


 


RDW Std Deviation   46.3   (28.0-62.0)  fl


 


RDW Coeff of Charlee   14   (11.0-15.0)  %


 


Plt Count   342   (150-400)  K/uL


 


MPV   10.60   (7.40-12.00)  fL


 


Add Manual Diff   YES   


 


Neutrophils % (Manual)   62   (48.0-80.0)  %


 


Band Neutrophils %   5   %


 


Lymphocytes % (Manual)   21   (16.0-40.0)  %


 


Monocytes % (Manual)   4   (0.0-15.0)  %


 


Eosinophils % (Manual)   7   (0.0-7.0)  %


 


Basophils % (Manual)   1   (0.0-1.5)  %


 


Absolute Seg Neuts   4.9   (1.4-5.7)  


 


Band Neutrophils #   0.4   


 


Lymphocytes # (Manual)   1.7   (0.6-2.4)  


 


Monocytes # (Manual)   0.3   (0.0-0.8)  


 


Eosinophils # (Manual)   0.6   (0.0-0.7)  


 


Basophils # (Manual)   0.1   (0.0-0.1)  


 


Absolute Retic     (20-80)  K/uL


 


Percent Retic     (0.5-1.5)  %


 


Immature Retic Fraction     %


 


Sodium    140  (136-145)  mmol/L


 


Potassium    4.5  (3.5-5.1)  mmol/L


 


Chloride    109 H  ()  mmol/L


 


Carbon Dioxide    20.4 L  (21.0-32.0)  mmol/L


 


BUN    25 H  (7.0-18.0)  mg/dL


 


Creatinine    1.9 H  (0.6-1.0)  mg/dL


 


Est Cr Clr Drug Dosing    19.01  mL/min


 


Estimated GFR (MDRD)    25.7  ml/min


 


Glucose    99  ()  mg/dL


 


Calcium    8.4 L  (8.5-10.1)  mg/dL


 


Iron     ()  ug/dL


 


TIBC     (250-450)  ug/dL


 


% Saturation     (20-55)  %


 


Transferrin     (200-400)  ug/dL


 


Ferritin     (8-252)  ng/mL


 


Creatine Kinase     ()  U/L


 


Vitamin B12  840    (193-986)  pg/mL


 


Folate  15.30    (8.60-58.90)  ng/mL














  04/17/19 Range/Units





  14:05 


 


WBC   (4.0-11.0)  K/uL


 


RBC   (4.30-5.90)  M/uL


 


Hgb  8.3 L  (12.0-16.0)  g/dL


 


Hct  25.9 L  (36.0-46.0)  %


 


MCV   (80.0-98.0)  fL


 


MCH   (27.0-32.0)  pg


 


MCHC   (31.0-37.0)  g/dL


 


RDW Std Deviation   (28.0-62.0)  fl


 


RDW Coeff of Charlee   (11.0-15.0)  %


 


Plt Count   (150-400)  K/uL


 


MPV   (7.40-12.00)  fL


 


Add Manual Diff   


 


Neutrophils % (Manual)   (48.0-80.0)  %


 


Band Neutrophils %   %


 


Lymphocytes % (Manual)   (16.0-40.0)  %


 


Monocytes % (Manual)   (0.0-15.0)  %


 


Eosinophils % (Manual)   (0.0-7.0)  %


 


Basophils % (Manual)   (0.0-1.5)  %


 


Absolute Seg Neuts   (1.4-5.7)  


 


Band Neutrophils #   


 


Lymphocytes # (Manual)   (0.6-2.4)  


 


Monocytes # (Manual)   (0.0-0.8)  


 


Eosinophils # (Manual)   (0.0-0.7)  


 


Basophils # (Manual)   (0.0-0.1)  


 


Absolute Retic   (20-80)  K/uL


 


Percent Retic   (0.5-1.5)  %


 


Immature Retic Fraction   %


 


Sodium   (136-145)  mmol/L


 


Potassium   (3.5-5.1)  mmol/L


 


Chloride   ()  mmol/L


 


Carbon Dioxide   (21.0-32.0)  mmol/L


 


BUN   (7.0-18.0)  mg/dL


 


Creatinine   (0.6-1.0)  mg/dL


 


Est Cr Clr Drug Dosing   mL/min


 


Estimated GFR (MDRD)   ml/min


 


Glucose   ()  mg/dL


 


Calcium   (8.5-10.1)  mg/dL


 


Iron   ()  ug/dL


 


TIBC   (250-450)  ug/dL


 


% Saturation   (20-55)  %


 


Transferrin   (200-400)  ug/dL


 


Ferritin   (8-252)  ng/mL


 


Creatine Kinase   ()  U/L


 


Vitamin B12   (193-986)  pg/mL


 


Folate   (8.60-58.90)  ng/mL











[facility] Results - Last 24 hrs: 


 Microbiology











 04/17/19 15:45 Stool Occult Blood ([facility]) - Final





 Stool / Feces    NEGATIVE OCCULT BLOOD


 


 04/16/19 14:28 Aerobic Blood Culture - Preliminary





 Blood - Venous - Lab Draw    NO GROWTH AFTER 1 DAY





 Anaerobic Blood Culture - Preliminary





    NO GROWTH AFTER 1 DAY


 


 04/16/19 14:20 Aerobic Blood Culture - Preliminary





 Blood - Venous    NO GROWTH AFTER 1 DAY





 Anaerobic Blood Culture - Preliminary





    NO GROWTH AFTER 1 DAY











Med Orders - Current: 


 Current Medications





Acetaminophen (Tylenol)  650 mg PO Q4H PRN


   PRN Reason: Pain (mild 1-3)


Aspirin (Aspirin)  81 mg PO DAILY [facility]


   Last Admin: 04/17/19 09:05 Dose:  81 mg


Fluticasone Propionate (Flonase)  0 gm NASBOTH DAILY [facility]


   Last Admin: 04/17/19 09:14 Dose:  1 spray


Metoprolol Succinate (Toprol Xl)  25 mg PO DAILY [facility]


   Last Admin: 04/17/19 09:05 Dose:  25 mg


Ondansetron HCl (Zofran)  4 mg IVPUSH Q4H PRN


   PRN Reason: Nausea


Sodium Chloride (Saline Flush)  10 ml FLUSH ASDIRECTED PRN


   PRN Reason: Keep Vein Open


   Last Admin: 04/16/19 14:10 Dose:  10 ml


Sodium Chloride (Saline Flush)  2.5 ml FLUSH ASDIRECTED PRN


   PRN Reason: Keep Vein Open


   Last Admin: 04/16/19 14:10 Dose:  2.5 ml


Zaleplon (Sonata)  5 mg PO BEDTIME [facility]


   Last Admin: 04/16/19 21:49 Dose:  5 mg





Discontinued Medications





Apixaban (Eliquis)  10 mg PO BID [facility]


   Last Admin: 04/17/19 09:04 Dose:  10 mg


Sodium Chloride (Normal Saline)  1,000 mls @ 125 mls/hr IV STAT [facility]


   Last Admin: 04/16/19 14:10 Dose:  125 mls/hr


Sodium Chloride (Normal Saline)  1,000 mls @ 100 mls/hr IV ASDIRECTED [facility]


   Last Admin: 04/17/19 06:45 Dose:  100 mls/hr

## 2019-04-17 NOTE — PCM.PN
<Roxy Zarate M - Last Filed: 04/17/19 10:34>





- General Info


Date of Service: 04/17/19


Admission Dx/Problem (Free Text): 


 Admission Diagnosis/Problem





Admission Diagnosis/Problem      Syncope





Subjective Update: 





Feeling better today. Sittin gup in the chair. NO pain. No concerns. No Chest 

pain. Denies lightheadedness and or dizziness. No shortness of breath. 


Functional Status: Reports: Pain Controlled, Tolerating Diet





- Review of Systems


General: Reports: Weakness (chronic weakness)


HEENT: Reports: No Symptoms.  Denies: Headaches, Sore Throat, Visual Changes


Pulmonary: Reports: No Symptoms.  Denies: Shortness of Breath


Cardiovascular: Reports: No Symptoms.  Denies: Chest Pain


Gastrointestinal: Reports: No Symptoms.  Denies: Abdominal Pain, Nausea, 

Vomiting


Genitourinary: Reports: No Symptoms.  Denies: Dysuria, Frequency, Burning


Musculoskeletal: Reports: No Symptoms


Skin: Reports: No Symptoms


Neurological: Reports: No Symptoms


Psychiatric: Reports: No Symptoms





- Patient Data


Vitals - Most Recent: 


 Last Vital Signs











Temp  96.8 F   04/17/19 07:44


 


Pulse  73   04/17/19 09:05


 


Resp  16   04/17/19 07:44


 


BP  130/59 L  04/17/19 09:05


 


Pulse Ox  95   04/17/19 07:44








 





Orthostatic Blood Pressure [     127/59


Standing]                        


Orthostatic Blood Pressure [     130/59


Sitting]                         


Orthostatic Blood Pressure [     115/54


Supine]                          








Weight - Most Recent: 71.1 kg


I&O - Last 24 Hours: 


 Intake & Output











 04/16/19 04/17/19 04/17/19





 22:59 06:59 14:59


 


Intake Total  400 


 


Output Total 900 600 


 


Balance -900 -200 











Lab Results Last 24 Hours: 


 Laboratory Results - last 24 hr











  04/16/19 04/16/19 04/16/19 Range/Units





  14:20 14:20 14:20 


 


WBC  11.97 H    (4.0-11.0)  K/uL


 


RBC  3.20 L    (4.30-5.90)  M/uL


 


Hgb  9.9 L    (12.0-16.0)  g/dL


 


Hct  30.6 L    (36.0-46.0)  %


 


MCV  95.6    (80.0-98.0)  fL


 


MCH  30.9    (27.0-32.0)  pg


 


MCHC  32.4    (31.0-37.0)  g/dL


 


RDW Std Deviation  52.0    (28.0-62.0)  fl


 


RDW Coeff of Charlee  15    (11.0-15.0)  %


 


Plt Count  427 H    (150-400)  K/uL


 


MPV  10.20    (7.40-12.00)  fL


 


Add Manual Diff  YES    


 


Neutrophils % (Manual)  77    (48.0-80.0)  %


 


Band Neutrophils %  6    %


 


Lymphocytes % (Manual)  9 L    (16.0-40.0)  %


 


Monocytes % (Manual)  5    (0.0-15.0)  %


 


Eosinophils % (Manual)  3    (0.0-7.0)  %


 


Basophils % (Manual)     (0.0-1.5)  %


 


Nucleated RBC %  0.0    /100WBC


 


Absolute Seg Neuts  9.2 H    (1.4-5.7)  


 


Band Neutrophils #  0.7    


 


Lymphocytes # (Manual)  1.1    (0.6-2.4)  


 


Monocytes # (Manual)  0.6    (0.0-0.8)  


 


Eosinophils # (Manual)  0.4    (0.0-0.7)  


 


Basophils # (Manual)     (0.0-0.1)  


 


Nucleated RBCs #  0    K/uL


 


Absolute Retic     (20-80)  K/uL


 


Percent Retic     (0.5-1.5)  %


 


Immature Retic Fraction     %


 


INR   1.27   


 


Sodium    140  (136-145)  mmol/L


 


Potassium    4.8  (3.5-5.1)  mmol/L


 


Chloride    107  ()  mmol/L


 


Carbon Dioxide    21.5  (21.0-32.0)  mmol/L


 


BUN    34 H  (7.0-18.0)  mg/dL


 


Creatinine    2.3 H  (0.6-1.0)  mg/dL


 


Est Cr Clr Drug Dosing    17.21  mL/min


 


Estimated GFR (MDRD)    20.6  ml/min


 


Glucose    125 H  ()  mg/dL


 


Calcium    9.7  (8.5-10.1)  mg/dL


 


Iron     ()  ug/dL


 


TIBC     (250-450)  ug/dL


 


% Saturation     (20-55)  %


 


Transferrin     (200-400)  ug/dL


 


Ferritin     (8-252)  ng/mL


 


Total Bilirubin    0.4  (0.2-1.0)  mg/dL


 


AST    23  (15-37)  IU/L


 


ALT    18  (14-63)  IU/L


 


Alkaline Phosphatase    48  ()  U/L


 


Creatine Kinase     ()  U/L


 


Troponin I    < 0.050  (0.000-0.056)  ng/mL


 


Total Protein    6.9  (6.4-8.2)  g/dL


 


Albumin    3.0 L  (3.4-5.0)  g/dL


 


Globulin    3.9  (2.6-4.0)  g/dL


 


Albumin/Globulin Ratio    0.8 L  (0.9-1.6)  


 


Vitamin B12     (193-986)  pg/mL


 


Folate     (8.60-58.90)  ng/mL


 


Urine Color     


 


Urine Appearance     


 


Urine pH     (5.0-8.0)  


 


Ur Specific Gravity     (1.001-1.035)  


 


Urine Protein     (NEGATIVE)  mg/dL


 


Urine Glucose (UA)     (NEGATIVE)  mg/dL


 


Urine Ketones     (NEGATIVE)  mg/dL


 


Urine Occult Blood     (NEGATIVE)  


 


Urine Nitrite     (NEGATIVE)  


 


Urine Bilirubin     (NEGATIVE)  


 


Urine Urobilinogen     (<2.0)  EU/dL


 


Ur Leukocyte Esterase     (NEGATIVE)  


 


Urine RBC     (0-2/HPF)  


 


Urine WBC     (0-5/HPF)  


 


Ur Epithelial Cells     (NONE-FEW)  


 


Urine Bacteria     (NEGATIVE)  


 


Urine Opiates Screen     (NEGATIVE)  


 


Ur Oxycodone Screen     (NEGATIVE)  


 


Urine Methadone Screen     (NEGATIVE)  


 


Ur Barbiturates Screen     (NEGATIVE)  


 


Ur Phencyclidine Scrn     (NEGATIVE)  


 


Ur Amphetamine Screen     (NEGATIVE)  


 


U Methamphetamines Scrn     (NEGATIVE)  


 


U Benzodiazepines Scrn     (NEGATIVE)  


 


U Cocaine Metab Screen     (NEGATIVE)  


 


U Marijuana (THC) Screen     (NEGATIVE)  














  04/16/19 04/16/19 04/16/19 Range/Units





  14:20 14:20 14:20 


 


WBC     (4.0-11.0)  K/uL


 


RBC    3.01 L  (4.30-5.90)  M/uL


 


Hgb     (12.0-16.0)  g/dL


 


Hct     (36.0-46.0)  %


 


MCV     (80.0-98.0)  fL


 


MCH     (27.0-32.0)  pg


 


MCHC     (31.0-37.0)  g/dL


 


RDW Std Deviation     (28.0-62.0)  fl


 


RDW Coeff of Charlee     (11.0-15.0)  %


 


Plt Count     (150-400)  K/uL


 


MPV     (7.40-12.00)  fL


 


Add Manual Diff     


 


Neutrophils % (Manual)     (48.0-80.0)  %


 


Band Neutrophils %     %


 


Lymphocytes % (Manual)     (16.0-40.0)  %


 


Monocytes % (Manual)     (0.0-15.0)  %


 


Eosinophils % (Manual)     (0.0-7.0)  %


 


Basophils % (Manual)     (0.0-1.5)  %


 


Nucleated RBC %     /100WBC


 


Absolute Seg Neuts     (1.4-5.7)  


 


Band Neutrophils #     


 


Lymphocytes # (Manual)     (0.6-2.4)  


 


Monocytes # (Manual)     (0.0-0.8)  


 


Eosinophils # (Manual)     (0.0-0.7)  


 


Basophils # (Manual)     (0.0-0.1)  


 


Nucleated RBCs #     K/uL


 


Absolute Retic    57.20  (20-80)  K/uL


 


Percent Retic    1.9 H  (0.5-1.5)  %


 


Immature Retic Fraction    18  %


 


INR     


 


Sodium     (136-145)  mmol/L


 


Potassium     (3.5-5.1)  mmol/L


 


Chloride     ()  mmol/L


 


Carbon Dioxide     (21.0-32.0)  mmol/L


 


BUN     (7.0-18.0)  mg/dL


 


Creatinine     (0.6-1.0)  mg/dL


 


Est Cr Clr Drug Dosing     mL/min


 


Estimated GFR (MDRD)     ml/min


 


Glucose     ()  mg/dL


 


Calcium     (8.5-10.1)  mg/dL


 


Iron   47 L   ()  ug/dL


 


TIBC   340   (250-450)  ug/dL


 


% Saturation   13.82 L   (20-55)  %


 


Transferrin   238   (200-400)  ug/dL


 


Ferritin   169   (8-252)  ng/mL


 


Total Bilirubin     (0.2-1.0)  mg/dL


 


AST     (15-37)  IU/L


 


ALT     (14-63)  IU/L


 


Alkaline Phosphatase     ()  U/L


 


Creatine Kinase  172    ()  U/L


 


Troponin I     (0.000-0.056)  ng/mL


 


Total Protein     (6.4-8.2)  g/dL


 


Albumin     (3.4-5.0)  g/dL


 


Globulin     (2.6-4.0)  g/dL


 


Albumin/Globulin Ratio     (0.9-1.6)  


 


Vitamin B12     (193-986)  pg/mL


 


Folate     (8.60-58.90)  ng/mL


 


Urine Color     


 


Urine Appearance     


 


Urine pH     (5.0-8.0)  


 


Ur Specific Gravity     (1.001-1.035)  


 


Urine Protein     (NEGATIVE)  mg/dL


 


Urine Glucose (UA)     (NEGATIVE)  mg/dL


 


Urine Ketones     (NEGATIVE)  mg/dL


 


Urine Occult Blood     (NEGATIVE)  


 


Urine Nitrite     (NEGATIVE)  


 


Urine Bilirubin     (NEGATIVE)  


 


Urine Urobilinogen     (<2.0)  EU/dL


 


Ur Leukocyte Esterase     (NEGATIVE)  


 


Urine RBC     (0-2/HPF)  


 


Urine WBC     (0-5/HPF)  


 


Ur Epithelial Cells     (NONE-FEW)  


 


Urine Bacteria     (NEGATIVE)  


 


Urine Opiates Screen     (NEGATIVE)  


 


Ur Oxycodone Screen     (NEGATIVE)  


 


Urine Methadone Screen     (NEGATIVE)  


 


Ur Barbiturates Screen     (NEGATIVE)  


 


Ur Phencyclidine Scrn     (NEGATIVE)  


 


Ur Amphetamine Screen     (NEGATIVE)  


 


U Methamphetamines Scrn     (NEGATIVE)  


 


U Benzodiazepines Scrn     (NEGATIVE)  


 


U Cocaine Metab Screen     (NEGATIVE)  


 


U Marijuana (THC) Screen     (NEGATIVE)  














  04/16/19 04/16/19 04/16/19 Range/Units





  14:20 14:55 14:55 


 


WBC     (4.0-11.0)  K/uL


 


RBC     (4.30-5.90)  M/uL


 


Hgb     (12.0-16.0)  g/dL


 


Hct     (36.0-46.0)  %


 


MCV     (80.0-98.0)  fL


 


MCH     (27.0-32.0)  pg


 


MCHC     (31.0-37.0)  g/dL


 


RDW Std Deviation     (28.0-62.0)  fl


 


RDW Coeff of Charlee     (11.0-15.0)  %


 


Plt Count     (150-400)  K/uL


 


MPV     (7.40-12.00)  fL


 


Add Manual Diff     


 


Neutrophils % (Manual)     (48.0-80.0)  %


 


Band Neutrophils %     %


 


Lymphocytes % (Manual)     (16.0-40.0)  %


 


Monocytes % (Manual)     (0.0-15.0)  %


 


Eosinophils % (Manual)     (0.0-7.0)  %


 


Basophils % (Manual)     (0.0-1.5)  %


 


Nucleated RBC %     /100WBC


 


Absolute Seg Neuts     (1.4-5.7)  


 


Band Neutrophils #     


 


Lymphocytes # (Manual)     (0.6-2.4)  


 


Monocytes # (Manual)     (0.0-0.8)  


 


Eosinophils # (Manual)     (0.0-0.7)  


 


Basophils # (Manual)     (0.0-0.1)  


 


Nucleated RBCs #     K/uL


 


Absolute Retic     (20-80)  K/uL


 


Percent Retic     (0.5-1.5)  %


 


Immature Retic Fraction     %


 


INR     


 


Sodium     (136-145)  mmol/L


 


Potassium     (3.5-5.1)  mmol/L


 


Chloride     ()  mmol/L


 


Carbon Dioxide     (21.0-32.0)  mmol/L


 


BUN     (7.0-18.0)  mg/dL


 


Creatinine     (0.6-1.0)  mg/dL


 


Est Cr Clr Drug Dosing     mL/min


 


Estimated GFR (MDRD)     ml/min


 


Glucose     ()  mg/dL


 


Calcium     (8.5-10.1)  mg/dL


 


Iron     ()  ug/dL


 


TIBC     (250-450)  ug/dL


 


% Saturation     (20-55)  %


 


Transferrin     (200-400)  ug/dL


 


Ferritin     (8-252)  ng/mL


 


Total Bilirubin     (0.2-1.0)  mg/dL


 


AST     (15-37)  IU/L


 


ALT     (14-63)  IU/L


 


Alkaline Phosphatase     ()  U/L


 


Creatine Kinase     ()  U/L


 


Troponin I     (0.000-0.056)  ng/mL


 


Total Protein     (6.4-8.2)  g/dL


 


Albumin     (3.4-5.0)  g/dL


 


Globulin     (2.6-4.0)  g/dL


 


Albumin/Globulin Ratio     (0.9-1.6)  


 


Vitamin B12  840    (193-986)  pg/mL


 


Folate  15.30    (8.60-58.90)  ng/mL


 


Urine Color   YELLOW   


 


Urine Appearance   CLEAR   


 


Urine pH   7.0   (5.0-8.0)  


 


Ur Specific Gravity   1.010   (1.001-1.035)  


 


Urine Protein   NEGATIVE   (NEGATIVE)  mg/dL


 


Urine Glucose (UA)   NEGATIVE   (NEGATIVE)  mg/dL


 


Urine Ketones   NEGATIVE   (NEGATIVE)  mg/dL


 


Urine Occult Blood   SMALL H   (NEGATIVE)  


 


Urine Nitrite   NEGATIVE   (NEGATIVE)  


 


Urine Bilirubin   NEGATIVE   (NEGATIVE)  


 


Urine Urobilinogen   0.2   (<2.0)  EU/dL


 


Ur Leukocyte Esterase   SMALL H   (NEGATIVE)  


 


Urine RBC   1-2   (0-2/HPF)  


 


Urine WBC   1-2   (0-5/HPF)  


 


Ur Epithelial Cells   RARE   (NONE-FEW)  


 


Urine Bacteria   RARE   (NEGATIVE)  


 


Urine Opiates Screen    NEGATIVE  (NEGATIVE)  


 


Ur Oxycodone Screen    NEGATIVE  (NEGATIVE)  


 


Urine Methadone Screen    NEGATIVE  (NEGATIVE)  


 


Ur Barbiturates Screen    NEGATIVE  (NEGATIVE)  


 


Ur Phencyclidine Scrn    NEGATIVE  (NEGATIVE)  


 


Ur Amphetamine Screen    NEGATIVE  (NEGATIVE)  


 


U Methamphetamines Scrn    NEGATIVE  (NEGATIVE)  


 


U Benzodiazepines Scrn    NEGATIVE  (NEGATIVE)  


 


U Cocaine Metab Screen    NEGATIVE  (NEGATIVE)  


 


U Marijuana (THC) Screen    NEGATIVE  (NEGATIVE)  














  04/17/19 04/17/19 Range/Units





  04:50 04:50 


 


WBC  7.91   (4.0-11.0)  K/uL


 


RBC  2.57 L   (4.30-5.90)  M/uL


 


Hgb  8.1 L   (12.0-16.0)  g/dL


 


Hct  24.9 L   (36.0-46.0)  %


 


MCV  96.9   (80.0-98.0)  fL


 


MCH  31.5   (27.0-32.0)  pg


 


MCHC  32.5   (31.0-37.0)  g/dL


 


RDW Std Deviation  46.3   (28.0-62.0)  fl


 


RDW Coeff of Charlee  14   (11.0-15.0)  %


 


Plt Count  342   (150-400)  K/uL


 


MPV  10.60   (7.40-12.00)  fL


 


Add Manual Diff  YES   


 


Neutrophils % (Manual)  62   (48.0-80.0)  %


 


Band Neutrophils %  5   %


 


Lymphocytes % (Manual)  21   (16.0-40.0)  %


 


Monocytes % (Manual)  4   (0.0-15.0)  %


 


Eosinophils % (Manual)  7   (0.0-7.0)  %


 


Basophils % (Manual)  1   (0.0-1.5)  %


 


Nucleated RBC %    /100WBC


 


Absolute Seg Neuts  4.9   (1.4-5.7)  


 


Band Neutrophils #  0.4   


 


Lymphocytes # (Manual)  1.7   (0.6-2.4)  


 


Monocytes # (Manual)  0.3   (0.0-0.8)  


 


Eosinophils # (Manual)  0.6   (0.0-0.7)  


 


Basophils # (Manual)  0.1   (0.0-0.1)  


 


Nucleated RBCs #    K/uL


 


Absolute Retic    (20-80)  K/uL


 


Percent Retic    (0.5-1.5)  %


 


Immature Retic Fraction    %


 


INR    


 


Sodium   140  (136-145)  mmol/L


 


Potassium   4.5  (3.5-5.1)  mmol/L


 


Chloride   109 H  ()  mmol/L


 


Carbon Dioxide   20.4 L  (21.0-32.0)  mmol/L


 


BUN   25 H  (7.0-18.0)  mg/dL


 


Creatinine   1.9 H  (0.6-1.0)  mg/dL


 


Est Cr Clr Drug Dosing   19.01  mL/min


 


Estimated GFR (MDRD)   25.7  ml/min


 


Glucose   99  ()  mg/dL


 


Calcium   8.4 L  (8.5-10.1)  mg/dL


 


Iron    ()  ug/dL


 


TIBC    (250-450)  ug/dL


 


% Saturation    (20-55)  %


 


Transferrin    (200-400)  ug/dL


 


Ferritin    (8-252)  ng/mL


 


Total Bilirubin    (0.2-1.0)  mg/dL


 


AST    (15-37)  IU/L


 


ALT    (14-63)  IU/L


 


Alkaline Phosphatase    ()  U/L


 


Creatine Kinase    ()  U/L


 


Troponin I    (0.000-0.056)  ng/mL


 


Total Protein    (6.4-8.2)  g/dL


 


Albumin    (3.4-5.0)  g/dL


 


Globulin    (2.6-4.0)  g/dL


 


Albumin/Globulin Ratio    (0.9-1.6)  


 


Vitamin B12    (193-986)  pg/mL


 


Folate    (8.60-58.90)  ng/mL


 


Urine Color    


 


Urine Appearance    


 


Urine pH    (5.0-8.0)  


 


Ur Specific Gravity    (1.001-1.035)  


 


Urine Protein    (NEGATIVE)  mg/dL


 


Urine Glucose (UA)    (NEGATIVE)  mg/dL


 


Urine Ketones    (NEGATIVE)  mg/dL


 


Urine Occult Blood    (NEGATIVE)  


 


Urine Nitrite    (NEGATIVE)  


 


Urine Bilirubin    (NEGATIVE)  


 


Urine Urobilinogen    (<2.0)  EU/dL


 


Ur Leukocyte Esterase    (NEGATIVE)  


 


Urine RBC    (0-2/HPF)  


 


Urine WBC    (0-5/HPF)  


 


Ur Epithelial Cells    (NONE-FEW)  


 


Urine Bacteria    (NEGATIVE)  


 


Urine Opiates Screen    (NEGATIVE)  


 


Ur Oxycodone Screen    (NEGATIVE)  


 


Urine Methadone Screen    (NEGATIVE)  


 


Ur Barbiturates Screen    (NEGATIVE)  


 


Ur Phencyclidine Scrn    (NEGATIVE)  


 


Ur Amphetamine Screen    (NEGATIVE)  


 


U Methamphetamines Scrn    (NEGATIVE)  


 


U Benzodiazepines Scrn    (NEGATIVE)  


 


U Cocaine Metab Screen    (NEGATIVE)  


 


U Marijuana (THC) Screen    (NEGATIVE)  











Med Orders - Current: 


 Current Medications





Acetaminophen (Tylenol)  650 mg PO Q4H PRN


   PRN Reason: Pain (mild 1-3)


Aspirin (Aspirin)  81 mg PO DAILY [facility]


   Last Admin: 04/17/19 09:05 Dose:  81 mg


Fluticasone Propionate (Flonase)  0 gm NASBOTH DAILY [facility]


   Last Admin: 04/17/19 09:14 Dose:  1 spray


Metoprolol Succinate (Toprol Xl)  25 mg PO DAILY [facility]


   Last Admin: 04/17/19 09:05 Dose:  25 mg


Ondansetron HCl (Zofran)  4 mg IVPUSH Q4H PRN


   PRN Reason: Nausea


Sodium Chloride (Saline Flush)  10 ml FLUSH ASDIRECTED PRN


   PRN Reason: Keep Vein Open


   Last Admin: 04/16/19 14:10 Dose:  10 ml


Sodium Chloride (Saline Flush)  2.5 ml FLUSH ASDIRECTED PRN


   PRN Reason: Keep Vein Open


   Last Admin: 04/16/19 14:10 Dose:  2.5 ml


Zaleplon (Sonata)  5 mg PO BEDTIME [facility]


   Last Admin: 04/16/19 21:49 Dose:  5 mg





Discontinued Medications





Apixaban (Eliquis)  10 mg PO BID [facility]


   Last Admin: 04/17/19 09:04 Dose:  10 mg


Sodium Chloride (Normal Saline)  1,000 mls @ 125 mls/hr IV STAT [facility]


   Last Admin: 04/16/19 14:10 Dose:  125 mls/hr


Sodium Chloride (Normal Saline)  1,000 mls @ 100 mls/hr IV ASDIRECTED [facility]


   Last Admin: 04/17/19 06:45 Dose:  100 mls/hr











- Exam


Quality Assessment: DVT Prophylaxis


General: Alert, Oriented, Cooperative


Neck: Supple


Lungs: Clear to Auscultation, Normal Respiratory Effort


Cardiovascular: Regular Rate, Irregular Rhythm


GI/Abdominal Exam: Normal Bowel Sounds, Soft, Non-Tender, No Organomegaly


Extremities: Normal Inspection, Normal Range of Motion, Non-Tender, No Pedal 

Edema


Wound/Incisions: Other (skin tear to L forearm)


Neurological: No New Focal Deficit


Psy/Mental Status: Alert, Normal Affect, Normal Mood





- Problem List & Annotations


(1) Syncope


SNOMED Code(s): 181805941


   Code(s): R55 - SYNCOPE AND COLLAPSE   Status: Acute   Current Visit: Yes   


Qualifiers: 


   Encounter type: initial encounter 





(2) Head injury


SNOMED Code(s): 08266392


   Code(s): S09.90XA - UNSPECIFIED INJURY OF HEAD, INITIAL ENCOUNTER   Status: 

Acute   Current Visit: Yes   


Qualifiers: 


   Encounter type: initial encounter   Qualified Code(s): S09.90XA - 

Unspecified injury of head, initial encounter   





(3) HTN (hypertension)


SNOMED Code(s): 94561954


   Code(s): I10 - ESSENTIAL (PRIMARY) HYPERTENSION   Status: Chronic   Current 

Visit: Yes   


Qualifiers: 


   Hypertension type: essential hypertension   Qualified Code(s): I10 - 

Essential (primary) hypertension   





(4) Afib


SNOMED Code(s): 85874107


   Code(s): I48.91 - UNSPECIFIED ATRIAL FIBRILLATION   Status: Chronic   

Current Visit: Yes   





(5) Chronic anticoagulation


SNOMED Code(s): 657913611


   Code(s): Z79.01 - LONG TERM (CURRENT) USE OF ANTICOAGULANTS   Status: 

Chronic   Current Visit: Yes   





(6) Urinary retention


SNOMED Code(s): 159849096


   Code(s): R33.9 - RETENTION OF URINE, UNSPECIFIED   Status: Chronic   Current 

Visit: Yes   





(7) Hx of deep venous thrombosis


SNOMED Code(s): 353732536


   Code(s): Z86.718 - PERSONAL HISTORY OF OTHER VENOUS THROMBOSIS AND EMBOLISM 

  Status: Chronic   Current Visit: Yes   











- Problem List Review


Problem List Initiated/Reviewed/Updated: Yes





- My Orders


Last 24 Hours: 


My Active Orders





04/16/19 15:57


Telemetry Monitoring [Cardiac Monitoring] [RC] Q8H 





04/16/19 15:59


Antiembolic Devices [RC] PER UNIT ROUTINE 


Oxygen Therapy [RC] PRN 


Up With Assistance [RC] ASDIRECTED 


VTE/DVT Education [RC] PER UNIT ROUTINE 


Vital Signs [RC] Q4H 


Acetaminophen [Tylenol]   650 mg PO Q4H PRN 


Ondansetron [Zofran]   4 mg IVPUSH Q4H PRN 


Sequential Compression Device [OM.PC] Per Unit Routine 





04/16/19 16:00


Orthostatic Vital Signs [RC] ASDIRECTED 





04/16/19 16:23


OCCULT BLOOD DIAGNOSTIC [OP] Routine 





04/16/19 16:26


Communication Order [RC] ROUTINE 





04/16/19 16:44


Neurological Monitoring [RC] Q2H 


Resuscitation Status Routine 





04/16/19 18:12


Consult to Physical Therapy [PT Evaluation and Treatment] [CONS] Routine 





04/16/19 21:00


Zaleplon [Sonata]   5 mg PO BEDTIME 





04/16/19 Dinner


Heart Healthy Diet [DIET] 





04/17/19 09:00


Aspirin   81 mg PO DAILY 


Fluticasone Propionate [Flonase]   See Dose Instructions  NASBOTH DAILY 


Metoprolol Succinate [Toprol XL]   25 mg PO DAILY 














- Plan


Plan:: 





This 78 year old female admitted with syncope and fall at home.





1. Syncope: Orthostatic VS this morning, negative. Monitor on telemetry. Has 

history of afib. 





2. Anemia: 8.1 today after hydration. Iron is low. Most recent labwork from 

[facility] was 12/31/18 Hgb was 12.3. Obtain hemoccult. Iron studies. Neuro checks 

due to hitting head and on anticoagulation.  





3. 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 Due to 

anemia, will hold Eliquis for now. I spoke with Dr Hook and he agrees 

with this plan. 





4. Afib: Telemetry SR. Continue metoprolol. Monitor on Telemetry. 





5. HTN: Monitor, currently stable. 





6. AUGUSTINA: Improved with hydration, from labwork, baseline is Cr 1.5 BUN 25.





7. Urinary retention: Continue self caths.


 


8. Weakness: PT to evaluate and treat





VTE prophylaxis: 





Dispo:1-2 days








<Joseluis Mane - Last Filed: 04/17/19 16:56>





- General Info


Admission Dx/Problem (Free Text): 





I have seen and examined the patient independently of Roxy Zarate CNP. I have 

discussed the case with her. I have reviewed and agreed with the plan of 

treatment as outlined for this patient by her. Please see orders.





- Patient Data


Vitals - Most Recent: 


 Last Vital Signs











Temp  36.7 C   04/17/19 16:00


 


Pulse  79   04/17/19 16:00


 


Resp  18   04/17/19 16:00


 


BP  140/46 L  04/17/19 16:00


 


Pulse Ox  99   04/17/19 16:00








 





Orthostatic Blood Pressure [     127/59


Standing]                        


Orthostatic Blood Pressure [     130/59


Sitting]                         


Orthostatic Blood Pressure [     115/54


Supine]                          








I&O - Last 24 Hours: 


 Intake & Output











 04/17/19 04/17/19 04/17/19





 06:59 14:59 22:59


 


Intake Total 400 240 480


 


Output Total 600  900


 


Balance -200 240 -420











Lab Results Last 24 Hours: 


 Laboratory Results - last 24 hr











  04/16/19 04/16/19 04/16/19 Range/Units





  14:20 14:20 14:20 


 


WBC     (4.0-11.0)  K/uL


 


RBC    3.01 L  (4.30-5.90)  M/uL


 


Hgb     (12.0-16.0)  g/dL


 


Hct     (36.0-46.0)  %


 


MCV     (80.0-98.0)  fL


 


MCH     (27.0-32.0)  pg


 


MCHC     (31.0-37.0)  g/dL


 


RDW Std Deviation     (28.0-62.0)  fl


 


RDW Coeff of Charlee     (11.0-15.0)  %


 


Plt Count     (150-400)  K/uL


 


MPV     (7.40-12.00)  fL


 


Add Manual Diff     


 


Neutrophils % (Manual)     (48.0-80.0)  %


 


Band Neutrophils %     %


 


Lymphocytes % (Manual)     (16.0-40.0)  %


 


Monocytes % (Manual)     (0.0-15.0)  %


 


Eosinophils % (Manual)     (0.0-7.0)  %


 


Basophils % (Manual)     (0.0-1.5)  %


 


Absolute Seg Neuts     (1.4-5.7)  


 


Band Neutrophils #     


 


Lymphocytes # (Manual)     (0.6-2.4)  


 


Monocytes # (Manual)     (0.0-0.8)  


 


Eosinophils # (Manual)     (0.0-0.7)  


 


Basophils # (Manual)     (0.0-0.1)  


 


Absolute Retic    57.20  (20-80)  K/uL


 


Percent Retic    1.9 H  (0.5-1.5)  %


 


Immature Retic Fraction    18  %


 


Sodium     (136-145)  mmol/L


 


Potassium     (3.5-5.1)  mmol/L


 


Chloride     ()  mmol/L


 


Carbon Dioxide     (21.0-32.0)  mmol/L


 


BUN     (7.0-18.0)  mg/dL


 


Creatinine     (0.6-1.0)  mg/dL


 


Est Cr Clr Drug Dosing     mL/min


 


Estimated GFR (MDRD)     ml/min


 


Glucose     ()  mg/dL


 


Calcium     (8.5-10.1)  mg/dL


 


Iron   47 L   ()  ug/dL


 


TIBC   340   (250-450)  ug/dL


 


% Saturation   13.82 L   (20-55)  %


 


Transferrin   238   (200-400)  ug/dL


 


Ferritin   169   (8-252)  ng/mL


 


Creatine Kinase  172    ()  U/L


 


Vitamin B12     (193-986)  pg/mL


 


Folate     (8.60-58.90)  ng/mL














  04/16/19 04/17/19 04/17/19 Range/Units





  14:20 04:50 04:50 


 


WBC   7.91   (4.0-11.0)  K/uL


 


RBC   2.57 L   (4.30-5.90)  M/uL


 


Hgb   8.1 L   (12.0-16.0)  g/dL


 


Hct   24.9 L   (36.0-46.0)  %


 


MCV   96.9   (80.0-98.0)  fL


 


MCH   31.5   (27.0-32.0)  pg


 


MCHC   32.5   (31.0-37.0)  g/dL


 


RDW Std Deviation   46.3   (28.0-62.0)  fl


 


RDW Coeff of Charlee   14   (11.0-15.0)  %


 


Plt Count   342   (150-400)  K/uL


 


MPV   10.60   (7.40-12.00)  fL


 


Add Manual Diff   YES   


 


Neutrophils % (Manual)   62   (48.0-80.0)  %


 


Band Neutrophils %   5   %


 


Lymphocytes % (Manual)   21   (16.0-40.0)  %


 


Monocytes % (Manual)   4   (0.0-15.0)  %


 


Eosinophils % (Manual)   7   (0.0-7.0)  %


 


Basophils % (Manual)   1   (0.0-1.5)  %


 


Absolute Seg Neuts   4.9   (1.4-5.7)  


 


Band Neutrophils #   0.4   


 


Lymphocytes # (Manual)   1.7   (0.6-2.4)  


 


Monocytes # (Manual)   0.3   (0.0-0.8)  


 


Eosinophils # (Manual)   0.6   (0.0-0.7)  


 


Basophils # (Manual)   0.1   (0.0-0.1)  


 


Absolute Retic     (20-80)  K/uL


 


Percent Retic     (0.5-1.5)  %


 


Immature Retic Fraction     %


 


Sodium    140  (136-145)  mmol/L


 


Potassium    4.5  (3.5-5.1)  mmol/L


 


Chloride    109 H  ()  mmol/L


 


Carbon Dioxide    20.4 L  (21.0-32.0)  mmol/L


 


BUN    25 H  (7.0-18.0)  mg/dL


 


Creatinine    1.9 H  (0.6-1.0)  mg/dL


 


Est Cr Clr Drug Dosing    19.01  mL/min


 


Estimated GFR (MDRD)    25.7  ml/min


 


Glucose    99  ()  mg/dL


 


Calcium    8.4 L  (8.5-10.1)  mg/dL


 


Iron     ()  ug/dL


 


TIBC     (250-450)  ug/dL


 


% Saturation     (20-55)  %


 


Transferrin     (200-400)  ug/dL


 


Ferritin     (8-252)  ng/mL


 


Creatine Kinase     ()  U/L


 


Vitamin B12  840    (193-986)  pg/mL


 


Folate  15.30    (8.60-58.90)  ng/mL














  04/17/19 Range/Units





  14:05 


 


WBC   (4.0-11.0)  K/uL


 


RBC   (4.30-5.90)  M/uL


 


Hgb  8.3 L  (12.0-16.0)  g/dL


 


Hct  25.9 L  (36.0-46.0)  %


 


MCV   (80.0-98.0)  fL


 


MCH   (27.0-32.0)  pg


 


MCHC   (31.0-37.0)  g/dL


 


RDW Std Deviation   (28.0-62.0)  fl


 


RDW Coeff of Charlee   (11.0-15.0)  %


 


Plt Count   (150-400)  K/uL


 


MPV   (7.40-12.00)  fL


 


Add Manual Diff   


 


Neutrophils % (Manual)   (48.0-80.0)  %


 


Band Neutrophils %   %


 


Lymphocytes % (Manual)   (16.0-40.0)  %


 


Monocytes % (Manual)   (0.0-15.0)  %


 


Eosinophils % (Manual)   (0.0-7.0)  %


 


Basophils % (Manual)   (0.0-1.5)  %


 


Absolute Seg Neuts   (1.4-5.7)  


 


Band Neutrophils #   


 


Lymphocytes # (Manual)   (0.6-2.4)  


 


Monocytes # (Manual)   (0.0-0.8)  


 


Eosinophils # (Manual)   (0.0-0.7)  


 


Basophils # (Manual)   (0.0-0.1)  


 


Absolute Retic   (20-80)  K/uL


 


Percent Retic   (0.5-1.5)  %


 


Immature Retic Fraction   %


 


Sodium   (136-145)  mmol/L


 


Potassium   (3.5-5.1)  mmol/L


 


Chloride   ()  mmol/L


 


Carbon Dioxide   (21.0-32.0)  mmol/L


 


BUN   (7.0-18.0)  mg/dL


 


Creatinine   (0.6-1.0)  mg/dL


 


Est Cr Clr Drug Dosing   mL/min


 


Estimated GFR (MDRD)   ml/min


 


Glucose   ()  mg/dL


 


Calcium   (8.5-10.1)  mg/dL


 


Iron   ()  ug/dL


 


TIBC   (250-450)  ug/dL


 


% Saturation   (20-55)  %


 


Transferrin   (200-400)  ug/dL


 


Ferritin   (8-252)  ng/mL


 


Creatine Kinase   ()  U/L


 


Vitamin B12   (193-986)  pg/mL


 


Folate   (8.60-58.90)  ng/mL











Александр Results Last 24 Hours: 


 Microbiology











 04/17/19 15:45 Stool Occult Blood (АЛЕКСАНДР) - Final





 Stool / Feces    NEGATIVE OCCULT BLOOD


 


 04/16/19 14:28 Aerobic Blood Culture - Preliminary





 Blood - Venous - Lab Draw    NO GROWTH AFTER 1 DAY





 Anaerobic Blood Culture - Preliminary





    NO GROWTH AFTER 1 DAY


 


 04/16/19 14:20 Aerobic Blood Culture - Preliminary





 Blood - Venous    NO GROWTH AFTER 1 DAY





 Anaerobic Blood Culture - Preliminary





    NO GROWTH AFTER 1 DAY











Med Orders - Current: 


 Current Medications





Acetaminophen (Tylenol)  650 mg PO Q4H PRN


   PRN Reason: Pain (mild 1-3)


Aspirin (Aspirin)  81 mg PO DAILY [facility]


   Last Admin: 04/17/19 09:05 Dose:  81 mg


Fluticasone Propionate (Flonase)  0 gm NASBOTH DAILY [facility]


   Last Admin: 04/17/19 09:14 Dose:  1 spray


Metoprolol Succinate (Toprol Xl)  25 mg PO DAILY [facility]


   Last Admin: 04/17/19 09:05 Dose:  25 mg


Ondansetron HCl (Zofran)  4 mg IVPUSH Q4H PRN


   PRN Reason: Nausea


Sodium Chloride (Saline Flush)  10 ml FLUSH ASDIRECTED PRN


   PRN Reason: Keep Vein Open


   Last Admin: 04/16/19 14:10 Dose:  10 ml


Sodium Chloride (Saline Flush)  2.5 ml FLUSH ASDIRECTED PRN


   PRN Reason: Keep Vein Open


   Last Admin: 04/16/19 14:10 Dose:  2.5 ml


Zaleplon (Sonata)  5 mg PO BEDTIME [facility]


   Last Admin: 04/16/19 21:49 Dose:  5 mg





Discontinued Medications





Apixaban (Eliquis)  10 mg PO BID [facility]


   Last Admin: 04/17/19 09:04 Dose:  10 mg


Sodium Chloride (Normal Saline)  1,000 mls @ 125 mls/hr IV STAT [facility]


   Last Admin: 04/16/19 14:10 Dose:  125 mls/hr


Sodium Chloride (Normal Saline)  1,000 mls @ 100 mls/hr IV ASDIRECTED [facility]


   Last Admin: 04/17/19 06:45 Dose:  100 mls/hr











- My Orders


Last 24 Hours: 


My Active Orders





04/16/19 19:04


Urinary Catheter Assessment [RC] ASDIRECTED 





04/16/19 19:15


Urinary Catheter Insertion [Insert Urinary Catheter] [OM.PC] Q24H

## 2019-06-24 ENCOUNTER — HOSPITAL ENCOUNTER (OUTPATIENT)
Dept: HOSPITAL 56 - MW.ED | Age: 79
Setting detail: OBSERVATION
LOS: 1 days | Discharge: NURSING, REHAB OR LONG-TERM CARE | End: 2019-06-25
Attending: INTERNAL MEDICINE | Admitting: INTERNAL MEDICINE
Payer: MEDICARE

## 2019-06-24 DIAGNOSIS — I12.9: ICD-10-CM

## 2019-06-24 DIAGNOSIS — R74.8: Primary | ICD-10-CM

## 2019-06-24 DIAGNOSIS — R33.9: ICD-10-CM

## 2019-06-24 DIAGNOSIS — Z87.891: ICD-10-CM

## 2019-06-24 DIAGNOSIS — R42: ICD-10-CM

## 2019-06-24 DIAGNOSIS — E11.22: ICD-10-CM

## 2019-06-24 DIAGNOSIS — Z91.040: ICD-10-CM

## 2019-06-24 DIAGNOSIS — Z79.01: ICD-10-CM

## 2019-06-24 DIAGNOSIS — Z88.5: ICD-10-CM

## 2019-06-24 DIAGNOSIS — D64.9: ICD-10-CM

## 2019-06-24 DIAGNOSIS — I48.91: ICD-10-CM

## 2019-06-24 DIAGNOSIS — I82.509: ICD-10-CM

## 2019-06-24 DIAGNOSIS — Z98.890: ICD-10-CM

## 2019-06-24 DIAGNOSIS — N18.9: ICD-10-CM

## 2019-06-24 DIAGNOSIS — Z79.899: ICD-10-CM

## 2019-06-24 DIAGNOSIS — S82.61XA: ICD-10-CM

## 2019-06-24 LAB
CHLORIDE SERPL-SCNC: 105 MMOL/L (ref 98–107)
SODIUM SERPL-SCNC: 138 MMOL/L (ref 136–145)

## 2019-06-24 PROCEDURE — 82550 ASSAY OF CK (CPK): CPT

## 2019-06-24 PROCEDURE — 70450 CT HEAD/BRAIN W/O DYE: CPT

## 2019-06-24 PROCEDURE — 36415 COLL VENOUS BLD VENIPUNCTURE: CPT

## 2019-06-24 PROCEDURE — 85025 COMPLETE CBC W/AUTO DIFF WBC: CPT

## 2019-06-24 PROCEDURE — 80048 BASIC METABOLIC PNL TOTAL CA: CPT

## 2019-06-24 PROCEDURE — 73610 X-RAY EXAM OF ANKLE: CPT

## 2019-06-24 PROCEDURE — 85610 PROTHROMBIN TIME: CPT

## 2019-06-24 PROCEDURE — 97161 PT EVAL LOW COMPLEX 20 MIN: CPT

## 2019-06-24 PROCEDURE — 80053 COMPREHEN METABOLIC PANEL: CPT

## 2019-06-24 PROCEDURE — 96375 TX/PRO/DX INJ NEW DRUG ADDON: CPT

## 2019-06-24 PROCEDURE — 96361 HYDRATE IV INFUSION ADD-ON: CPT

## 2019-06-24 PROCEDURE — 96374 THER/PROPH/DIAG INJ IV PUSH: CPT

## 2019-06-24 PROCEDURE — 84484 ASSAY OF TROPONIN QUANT: CPT

## 2019-06-24 PROCEDURE — 93005 ELECTROCARDIOGRAM TRACING: CPT

## 2019-06-24 PROCEDURE — G0378 HOSPITAL OBSERVATION PER HR: HCPCS

## 2019-06-24 PROCEDURE — 99285 EMERGENCY DEPT VISIT HI MDM: CPT

## 2019-06-24 NOTE — CT
INDICATION:



Head pain. Fell 3 days ago, striking the back of the head, without loss of 

consciousness. 



COMPARISON:



COMPARISON DATE 



TECHNIQUE:



CT examination of the head was performed with 3 mm thick axial sections 

without intravenous contrast. Images were obtained from the vertex of the 

skull through the skull base, and I examined the images with the brain and 

bone windows. 



Please note that all CT scans at this facility use dose modulation, 

iterative reconstruction, and/or weight-based dosing when appropriate to 

reduce radiation dose to as low as reasonably achievable. 



FINDINGS:



Again seen is an old lacunar infarct in the anterior left periventricular 

white matter with a layering degeneration extending through the left 

putamen. 



The brain itself is otherwise normal in appearance for the patient`s age on 

today`s study, with no sign of mass lesion, mass effect, hemorrhage, or 

edema. 



There continues to be mild dilatation of the ventricles and sulci 

representing mild, age-appropriate atrophy.



There is a stable 6 millimeter calcified dural mass in the high left 

anterior parietal region with no mass effect upon the underlying brain, 

consistent with a meningioma. 



There is a stable 9 x 5 millimeter calcified dural mass in the right mid 

parietal region with no mass effect upon the underlying brain, consistent 

with a 2nd meningioma. 



Again seen are changes of right cataract surgery. The left globe is normal 

in appearance.



The previously seen bilateral acute maxillary sinusitis has resolved. The 

previously seen acute moderate right sphenoid sinus has improved and is now 

mild. The rest of the visualized paranasal sinuses remain clear.



Again seen is patchy opacification of the posterior mastoids, right greater 

than left, consistent with mild mastoiditis. 



The osseous structures are normal in their appearance with no sign of 

abnormality in the skull base or calvarium. 



IMPRESSION:



No sign of acute injury to the brain, with no sign of closed head injury. 



Stable mild, age-appropriate atrophy. Again seen is a lacunar infarct in 

the anterior left periventricular white matter. 



Stable small bilateral meningiomas, with no mass effect upon the underlying 

brain. 



Resolution of previously seen moderate acute bilateral maxillary sinusitis. 

Improvement in acute right sphenoid sinusitis, now mild.



Please note that all CT scans at this facility use dose modulation, 

iterative reconstruction, and/or weight-based dosing when appropriate to 

reduce radiation dose to as low as reasonably achievable.



Dictated by Abraham Reyes MD @ Jun 24 2019  6:22PM



Signed by Dr. Abraham Reyes @ Jun 24 2019  6:30PM

## 2019-06-24 NOTE — PCM.HP
H&P History of Present Illness





- General


Date of Service: 06/24/19


Admit Problem/Dx: 


 Admission Diagnosis/Problem





Admission Diagnosis/Problem      Elevated troponin I level











- History of Present Illness


Initial Comments - Free Text/Narative: 





79 yo female with pmh of atrial fibrillation on anticoagulation who self caths 

who presented to the ED with three day history of right ankle pain.  She 

reported the pain started after she fell in the kitchen from a syncopal event.  

Patient does report ocassional dizzy spells but has only passed out one other 

time in her life.  IN the ED she was noted to have an oblique minimally 

displace lateral maleolar fracture.  Her troponin was noted to be mildly 

elevated.  She denies any chest pain, shortness of breath, or palpitations.


  ** right foot and ankle


Pain Score (Numeric/FACES): 2





- Related Data


Allergies/Adverse Reactions: 


 Allergies











Allergy/AdvReac Type Severity Reaction Status Date / Time


 


Latex, Natural Rubber Allergy Intermediate Itching Verified 06/24/19 21:55


 


codeine Allergy  Nausea and Verified 06/24/19 21:53





   Vomiting  


 


codeine Allergy  Anaphylactic Uncoded 04/16/19 17:56





   Shock  











Home Medications: 


 Home Meds





Chlorthalidone 25 mg PO BEDTIME 04/16/19 [History]


Fenofibrate 145 mg PO BEDTIME 04/16/19 [History]


Metoprolol Succinate [Kapspargo Sprinkle] 25 mg PO BEDTIME 04/16/19 [History]


Ramipril [Altace] 5 mg PO BEDTIME 04/16/19 [History]


Rosuvastatin Calcium 20 mg PO BEDTIME 04/16/19 [History]


Apixaban [Eliquis] 2.5 mg PO BEDTIME 06/24/19 [History]


Aspirin [Guilherme Chewable Aspirin] 162 mg PO BEDTIME 06/24/19 [History]


Iron Ps Cmplx/Vit B12/Fa [Poly-Iron 150 Forte] 1 each PO BEDTIME 06/24/19 [

History]


Melatonin 5 mg PO BEDTIME 06/24/19 [History]


Metaxalone [Metaxall] 800 mg PO BEDTIME 06/24/19 [History]


Vit C/E/Zn/Coppr/Lutein/Zeaxan [Preservision Areds 2 Softgel] 1 each PO BEDTIME 

06/24/19 [History]











Past Medical History





- Past Health History


Medical/Surgical History: Denies Medical/Surgical History


HEENT History: Reports: Impaired Vision


Cardiovascular History: Reports: Afib, Blood Clots/VTE/DVT, High Cholesterol, 

Hypertension, Stents, Syncope


Respiratory History: Reports: None


Gastrointestinal History: Reports: None, GERD


Other Gastrointestinal History: ulcers, takes Baking soda


Genitourinary History: Reports: Retention, Urinary


Other Genitourinary History: patient straight caths


OB/GYN History: Reports: None


Other OB/BYN History: uterine cancer, several years ago


Musculoskeletal History: Reports: Back Pain, Chronic, Osteoarthritis


Neurological History: Reports: None


Psychiatric History: Reports: None


Endocrine/Metabolic History: Reports: Diabetes, Type II


Other Endocrine/Metabolic History: patient states had Type II diabetes, but 

resolved when lost weight


Hematologic History: Reports: Anticoagulation Therapy


Immunologic History: Reports: None


Oncologic (Cancer) History: Reports: None


Dermatologic History: Reports: None





- Infectious Disease History


Infectious Disease History: Reports: Chicken Pox, Measles, Mumps





- Past Surgical History


Head Surgeries/Procedures: Reports: None


HEENT Surgical History: Reports: Cataract Surgery, Tonsillectomy


Cardiovascular Surgical History: Reports: Vascular Surgery, Other (See Below)


Other Cardiovascular Surgeries/Procedures: reports peripheral stents


GI Surgical History: Reports: Hernia, Abdominal


Female  Surgical History: Reports: Hysterectomy, Salpingo-Oophorectomy


Musculoskeletal Surgical History: Reports: Other (See Below)


Other Musculoskeletal Surgeries/Procedures:: "surgery on my back" "I don't know 

what kind"





Social & Family History





- Family History


Family Medical History: Noncontributory





- Tobacco Use


Smoking Status *Q: Former Smoker


Years of Tobacco use: 35


Used Tobacco, but Quit: Yes


Month/Year Tobacco Last Used: 01/2016





- Caffeine Use


Caffeine Use: Reports: Coffee, Soda





- Recreational Drug Use


Recreational Drug Use: No





- Living Situation & Occupation


Living situation: Reports: Alone


Occupation: Retired





H&P Review of Systems





- Review of Systems:


Review Of Systems: ROS reveals no pertinent complaints other than HPI.





Exam





- Exam


Exam: See Below





- Vital Signs


Vital Signs: 


 Last Vital Signs











Temp  36.5 C   06/24/19 19:26


 


Pulse  76   06/24/19 19:26


 


Resp  18   06/24/19 19:26


 


BP  138/57 L  06/24/19 19:26


 


Pulse Ox  98   06/24/19 19:26











Weight: 61.433 kg





- Exam


General: Alert, Oriented


HEENT: Mucosa Moist & Pink


Lungs: Clear to Auscultation, Normal Respiratory Effort


GI/Abdominal Exam: Soft


Extremities: No Pedal Edema


Skin: Warm, Dry, Intact





- Patient Data


Lab Results Last 24 hrs: 


 Laboratory Results - last 24 hr











  06/24/19 06/24/19 06/24/19 Range/Units





  17:00 17:00 17:00 


 


WBC  4.70    (4.0-11.0)  K/uL


 


RBC  3.61 L    (4.30-5.90)  M/uL


 


Hgb  11.1 L    (12.0-16.0)  g/dL


 


Hct  34.7 L    (36.0-46.0)  %


 


MCV  96.1    (80.0-98.0)  fL


 


MCH  30.7    (27.0-32.0)  pg


 


MCHC  32.0    (31.0-37.0)  g/dL


 


RDW Std Deviation  49.1    (28.0-62.0)  fl


 


RDW Coeff of Charlee  14    (11.0-15.0)  %


 


Plt Count  343    (150-400)  K/uL


 


MPV  10.40    (7.40-12.00)  fL


 


Neut % (Auto)  56.9    (48.0-80.0)  %


 


Lymph % (Auto)  24.9    (16.0-40.0)  %


 


Mono % (Auto)  13.6    (0.0-15.0)  %


 


Eos % (Auto)  4.0    (0.0-7.0)  %


 


Baso % (Auto)  0.6    (0.0-1.5)  %


 


Neut # (Auto)  2.7    (1.4-5.7)  K/uL


 


Lymph # (Auto)  1.2    (0.6-2.4)  K/uL


 


Mono # (Auto)  0.6    (0.0-0.8)  K/uL


 


Eos # (Auto)  0.2    (0.0-0.7)  K/uL


 


Baso # (Auto)  0.0    (0.0-0.1)  K/uL


 


Nucleated RBC %  0.0    /100WBC


 


Nucleated RBCs #  0    K/uL


 


INR    1.08  


 


Sodium   138   (136-145)  mmol/L


 


Potassium   4.9   (3.5-5.1)  mmol/L


 


Chloride   105   ()  mmol/L


 


Carbon Dioxide   24.5   (21.0-32.0)  mmol/L


 


BUN   25 H   (7.0-18.0)  mg/dL


 


Creatinine   1.4 H   (0.6-1.0)  mg/dL


 


Est Cr Clr Drug Dosing   25.80   mL/min


 


Estimated GFR (MDRD)   36.6   ml/min


 


Glucose   96   ()  mg/dL


 


Calcium   9.6   (8.5-10.1)  mg/dL


 


Total Bilirubin   0.5   (0.2-1.0)  mg/dL


 


AST   46 H   (15-37)  IU/L


 


ALT   27   (14-63)  IU/L


 


Alkaline Phosphatase   51   ()  U/L


 


Creatine Kinase     ()  U/L


 


Troponin I   0.439 H*   (0.000-0.056)  ng/mL


 


Total Protein   6.9   (6.4-8.2)  g/dL


 


Albumin   3.5   (3.4-5.0)  g/dL


 


Globulin   3.4   (2.6-4.0)  g/dL


 


Albumin/Globulin Ratio   1.0   (0.9-1.6)  














  06/24/19 Range/Units





  17:00 


 


WBC   (4.0-11.0)  K/uL


 


RBC   (4.30-5.90)  M/uL


 


Hgb   (12.0-16.0)  g/dL


 


Hct   (36.0-46.0)  %


 


MCV   (80.0-98.0)  fL


 


MCH   (27.0-32.0)  pg


 


MCHC   (31.0-37.0)  g/dL


 


RDW Std Deviation   (28.0-62.0)  fl


 


RDW Coeff of Charlee   (11.0-15.0)  %


 


Plt Count   (150-400)  K/uL


 


MPV   (7.40-12.00)  fL


 


Neut % (Auto)   (48.0-80.0)  %


 


Lymph % (Auto)   (16.0-40.0)  %


 


Mono % (Auto)   (0.0-15.0)  %


 


Eos % (Auto)   (0.0-7.0)  %


 


Baso % (Auto)   (0.0-1.5)  %


 


Neut # (Auto)   (1.4-5.7)  K/uL


 


Lymph # (Auto)   (0.6-2.4)  K/uL


 


Mono # (Auto)   (0.0-0.8)  K/uL


 


Eos # (Auto)   (0.0-0.7)  K/uL


 


Baso # (Auto)   (0.0-0.1)  K/uL


 


Nucleated RBC %   /100WBC


 


Nucleated RBCs #   K/uL


 


INR   


 


Sodium   (136-145)  mmol/L


 


Potassium   (3.5-5.1)  mmol/L


 


Chloride   ()  mmol/L


 


Carbon Dioxide   (21.0-32.0)  mmol/L


 


BUN   (7.0-18.0)  mg/dL


 


Creatinine   (0.6-1.0)  mg/dL


 


Est Cr Clr Drug Dosing   mL/min


 


Estimated GFR (MDRD)   ml/min


 


Glucose   ()  mg/dL


 


Calcium   (8.5-10.1)  mg/dL


 


Total Bilirubin   (0.2-1.0)  mg/dL


 


AST   (15-37)  IU/L


 


ALT   (14-63)  IU/L


 


Alkaline Phosphatase   ()  U/L


 


Creatine Kinase  46  ()  U/L


 


Troponin I   (0.000-0.056)  ng/mL


 


Total Protein   (6.4-8.2)  g/dL


 


Albumin   (3.4-5.0)  g/dL


 


Globulin   (2.6-4.0)  g/dL


 


Albumin/Globulin Ratio   (0.9-1.6)  











Result Diagrams: 


 06/25/19 05:08





 06/25/19 05:08


Problem List Initiated/Reviewed/Updated: Yes


Orders Last 24hrs: 


 Active Orders 24 hr











 Category Date Time Status


 


 Admission Status [Patient Status] [ADT] Stat ADT  06/24/19 19:07 Active


 


 Oxygen Therapy [RC] PRN Care  06/24/19 23:21 Ordered


 


 VTE/DVT Education [RC] PER UNIT ROUTINE Care  06/24/19 23:21 Ordered


 


 Vital Signs [RC] Q4H Care  06/24/19 23:21 Ordered


 


 American Diabetic [employer] Diet [DIET] Diet  06/24/19 Breakfast Ordered


 


 BASIC METABOLIC PANEL,BMP [CHEM] AM Lab  06/25/19 05:11 Ordered


 


 CBC WITH AUTO DIFF [HEME] AM Lab  06/25/19 05:11 Ordered


 


 TROPONIN I [CHEM] AM Lab  06/25/19 05:11 Ordered


 


 TROPONIN I [CHEM] Routine Lab  06/24/19 22:34 Ordered


 


 Apixaban [Eliquis] Med  06/25/19 21:00 Ordered





 2.5 mg PO BEDTIME   


 


 Aspirin Med  06/25/19 21:00 Ordered





 162 mg PO BEDTIME   


 


 Chlorthalidone Med  06/25/19 21:00 Ordered





 25 mg PO BEDTIME   


 


 Fenofibrate [Fenofibrate] Med  06/25/19 21:00 Ordered





 145 mg PO BEDTIME   


 


 Melatonin [Melatonin] Med  06/25/19 21:00 Ordered





 5 mg PO BEDTIME   


 


 Metoprolol Succinate [Kapspargo Sprinkle] Med  06/24/19 21:00 Ordered





 25 mg PO BEDTIME   


 


 Ramipril Med  06/24/19 21:00 Ordered





 5 mg PO BEDTIME   


 


 Rosuvastatin Calcium [Rosuvastatin Calcium] Med  06/25/19 21:00 Ordered





 20 mg PO BEDTIME   


 


 Sodium Chloride 0.9% [Normal Saline] 1,000 ml Med  06/24/19 18:23 Active





 IV STAT   


 


 DME for Discharge [COMM] Stat Oth  06/24/19 18:03 Ordered


 


 Resuscitation Status Routine Resus Stat  06/24/19 23:21 Ordered








 Medication Orders





Apixaban (Eliquis)  2.5 mg PO BEDTIME [facility]


Aspirin (Aspirin)  162 mg PO BEDTIME [facility]


Chlorthalidone (Chlorthalidone)  25 mg PO BEDTIME [facility]


Sodium Chloride (Normal Saline)  1,000 mls @ 100 mls/hr IV STAT [facility]


   Stop: 06/25/19 04:22


   Last Admin: 06/24/19 18:51  Dose: 100 mls/hr


Melatonin (Melatonin)  6 mg PO BEDTIME [facility]


Metoprolol Succinate (Toprol Xl)  25 mg PO BEDTIME [facility]


(Fenofibrate [ Fenofibrate] 145 Mg) ***Own Med***  145 mg PO BEDTIME [facility]


Ramipril (Altace)  5 mg PO BEDTIME [facility]


Rosuvastatin Calcium (Crestor)  20 mg PO BEDTIME [facility]








Assessment/Plan Comment:: 





79 yo female admitted following a syncopal episode three days ago resulting in 

fracture right ankle and then found to have mildly elevated troponin.


Ankle fracture: will be non weight bearing overnight until ortho consultation 

tomorrow.


Elevated troponins.  We will trend.  Patient has no history of chest pain.


Syncope: will monitor on telemetry.  Will check echocardiogram. Will check 

glucose frequently due to history of hypoglycemia in past but that apparently 

resolved with discontinuation of oral hypoglycemics.

## 2019-06-24 NOTE — EDM.PDOC
ED HPI GENERAL MEDICAL PROBLEM





- General


Chief Complaint: Lower Extremity Injury/Pain


Stated Complaint: AMB


Time Seen by Provider: 06/24/19 16:25


Source of Information: Reports: Patient


History Limitations: Reports: No Limitations





- History of Present Illness


INITIAL COMMENTS - FREE TEXT/NARRATIVE: 


HISTORY AND PHYSICAL:





History of present illness:


Patient is a 78-year-old female who presents to the emergency room today with 

complaints of right lateral ankle pain. She came in via ambulance due to 

increased pain. She states the initial injury occurred approximately 3 days 

ago. She is unsure of the circumstances surrounding her fall. She states that 

she may or may not have hit her head. She states she has been ambulatory 

although it has been increasingly painful over the last few days. Has had a few 

episodes where she feels dizzy and unsteady on her feet. Patient denies any 

fever, chills, headache, change in vision, syncope or near syncope. Denies any 

chest pain, back pain, shortness of breath or cough. Denies any abdominal pain, 

nausea, vomiting, diarrhea, constipation or dysuria. Patient has been eating 

and drinking appropriately.





Past medical history of atrial fibrillation, hypertension, syncopal events and 

type 2 diabetes. Patient is on anticoagulant therapy. 





Review of systems: 


As per history of present illness and below otherwise all systems reviewed and 

negative.





Past medical history: 


As per history of present illness and as reviewed below otherwise 

noncontributory.





Surgical history: 


As per history of present illness and as reviewed below otherwise 

noncontributory.





Social history: 


See social history for further information





Family history: 


As per history of present illness and as reviewed below otherwise 

noncontributory.





Physical exam:


General: Well-developed and well-nourished 78 she'll female. Alert and 

oriented. Nontoxic appearing and in no acute distress.


HEENT: Atraumatic, normocephalic, pupils equal and reactive bilaterally, 

negative for conjunctival pallor or scleral icterus, mucous membranes moist, 

TMs normal bilaterally, throat clear, neck supple, nontender, trachea midline. 

No drooling or trismus noted. No meningeal signs. No hot potato voice noted. 


Lungs: Clear to auscultation, breath sounds equal bilaterally, chest nontender.


Heart: S1S2, regular rate and rhythm without overt murmur


Abdomen: Soft, nondistended, nontender. Negative for masses. Negative for 

costovertebral tenderness.


Pelvis: Stable nontender.


Skin: Intact, warm, dry. No lesions or rashes noted.


Extremities: Bruising to the right lateral ankle with mild tenderness to 

palpation, moves all extremities per self without difficulty or deficits, 

negative for cords or calf pain. Trace edema to bilateral lower extremities. 

Palpable pretibial and pedal pulse. Neurovascular unremarkable.


Neuro: Awake, alert, oriented. Cranial nerves II through XII unremarkable. 

Cerebellum unremarkable. Motor and sensory unremarkable throughout. Exam 

nonfocal.





Notes:


Lateral soft tissue swelling with an oblique minimally displaced lateral 

malleolus fracture. 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 

Ventricular white matter with layering degeneration extending through the left 

putamen. Otherwise normal in appearance for patient's age. No sign of mass, 

lesion mass effect, hemorrhage or edema. Patient does have an elevated 

troponin. There is no acute findings on her EKG. Patient reports that she does 

have intermittent bouts of dizziness, although has never had any chest pain or 

shortness of breath. She is agreeable for admission, declines transfer.





Dr Lainez was consult did on this case. He is agreeable for observation 

admission with telemetry





Diagnostics:


CBC, CMP, troponin, EKG, head CT, right lateral ankle





Therapeutics:


Aspirin, Morphine





Impression: 


Lateral malleolus fracture, right


Elevated troponin


Dizziness





Plan:


Observation admission with telemetry





Definitive disposition and diagnosis as appropriate pending reevaluation and 

review of above.





  ** right foot and ankle


Pain Score (Numeric/FACES): 6





- Related Data


 Allergies











Allergy/AdvReac Type Severity Reaction Status Date / Time


 


codeine Allergy  Nausea and Verified 03/23/19 13:37





   Vomiting  


 


codeine Allergy  Anaphylactic Uncoded 04/16/19 17:56





   Shock  











Home Meds: 


 Home Meds





Chlorthalidone 25 mg PO DAILY 04/16/19 [History]


Fenofibrate 145 mg PO DAILY 04/16/19 [History]


Metoprolol Succinate [Kapspargo Sprinkle] 25 mg PO DAILY 04/16/19 [History]


Ramipril [Altace] 5 mg PO DAILY 04/16/19 [History]


Rosuvastatin Calcium 20 mg PO DAILY 04/16/19 [History]


Aspirin [Guilherme Chewable Aspirin] 81 mg PO DAILY #0 04/17/19 [Rx]


Iron Ps Cmplx/Vit B12/Fa [Poly-Iron 150 Forte] 1 each PO DAILY #30 capsule 04/17 /19 [Rx]


Apixaban [Eliquis] 5 mg PO DAILY 06/24/19 [History]


Melatonin 5 mg PO DAILY 06/24/19 [History]


Metaxalone [Metaxall] 800 mg PO BEDTIME 06/24/19 [History]


Rosuvastatin Calcium 20 mg PO DAILY 06/24/19 [History]


Vit C/E/Zn/Coppr/Lutein/Zeaxan [Preservision Areds 2 Softgel] 1 each PO DAILY 06 /24/19 [History]











Past Medical History





- Past Health History


Medical/Surgical History: Denies Medical/Surgical History


HEENT History: Reports: None


Cardiovascular History: Reports: Afib, Blood Clots/VTE/DVT, High Cholesterol, 

Hypertension, Syncope.  Denies: Heart Failure, MI


Respiratory History: Reports: None.  Denies: COPD, SOB


Gastrointestinal History: Reports: None.  Denies: GERD, GI Bleed


Other Gastrointestinal History: ulcers, takes Baking soda


Genitourinary History: Reports: Retention, Urinary (self caths)


Other Genitourinary History: patient straight caths


OB/GYN History: Reports: None


Other OB/GYN History: uterine cancer, several years ago


Musculoskeletal History: Reports: None


Neurological History: Reports: None.  Denies: CVA, TIA


Psychiatric History: Reports: None


Endocrine/Metabolic History: Reports: None.  Denies: Diabetes, Type II


Other Endocrine/Metabolic History: patient states had Type II diabetes, but 

resolved when lost weight


Hematologic History: Reports: Anticoagulation Therapy


Immunologic History: Reports: None


Oncologic (Cancer) History: Reports: None


Dermatologic History: Reports: None





- Infectious Disease History


Infectious Disease History: Reports: Chicken Pox, Measles, Mumps





- Past Surgical History


Cardiovascular Surgical History: Reports: Other (See Below) (reports stents in 

her legs from clots)


Female  Surgical History: Reports: Hysterectomy





Social & Family History





- Family History


Family Medical History: Noncontributory





- Caffeine Use


Caffeine Use: Reports: Soda, Tea





- Living Situation & Occupation


Living situation: Reports: Alone


Occupation: Retired





Review of Systems





- Review of Systems


Review Of Systems: ROS reveals no pertinent complaints other than HPI.





ED EXAM, GENERAL





- Physical Exam


Exam: See Below (See dictation)





Course





- Vital Signs


Last Recorded V/S: 


 Last Vital Signs











Temp  98.2 F   06/24/19 16:20


 


Pulse  85   06/24/19 16:20


 


Resp  18   06/24/19 16:20


 


BP  175/103 H  06/24/19 16:20


 


Pulse Ox  98   06/24/19 16:20














- Orders/Labs/Meds


Orders: 


 Active Orders 24 hr











 Category Date Time Status


 


 EKG Documentation Completion [RC] STAT Care  06/24/19 16:26 Active


 


 Sodium Chloride 0.9% [Normal Saline] 1,000 ml Med  06/24/19 18:23 Active





 IV STAT   


 


 DME for Discharge [COMM] Stat Oth  06/24/19 18:03 Ordered








 Medication Orders





Sodium Chloride (Normal Saline)  1,000 mls @ 100 mls/hr IV STAT ONE


   Stop: 06/25/19 04:22


   Last Admin: 06/24/19 18:51  Dose: 100 mls/hr








Labs: 


 Laboratory Tests











  06/24/19 06/24/19 06/24/19 Range/Units





  17:00 17:00 17:00 


 


WBC  4.70    (4.0-11.0)  K/uL


 


RBC  3.61 L    (4.30-5.90)  M/uL


 


Hgb  11.1 L    (12.0-16.0)  g/dL


 


Hct  34.7 L    (36.0-46.0)  %


 


MCV  96.1    (80.0-98.0)  fL


 


MCH  30.7    (27.0-32.0)  pg


 


MCHC  32.0    (31.0-37.0)  g/dL


 


RDW Std Deviation  49.1    (28.0-62.0)  fl


 


RDW Coeff of Charlee  14    (11.0-15.0)  %


 


Plt Count  343    (150-400)  K/uL


 


MPV  10.40    (7.40-12.00)  fL


 


Neut % (Auto)  56.9    (48.0-80.0)  %


 


Lymph % (Auto)  24.9    (16.0-40.0)  %


 


Mono % (Auto)  13.6    (0.0-15.0)  %


 


Eos % (Auto)  4.0    (0.0-7.0)  %


 


Baso % (Auto)  0.6    (0.0-1.5)  %


 


Neut # (Auto)  2.7    (1.4-5.7)  K/uL


 


Lymph # (Auto)  1.2    (0.6-2.4)  K/uL


 


Mono # (Auto)  0.6    (0.0-0.8)  K/uL


 


Eos # (Auto)  0.2    (0.0-0.7)  K/uL


 


Baso # (Auto)  0.0    (0.0-0.1)  K/uL


 


Nucleated RBC %  0.0    /100WBC


 


Nucleated RBCs #  0    K/uL


 


INR    1.08  


 


Sodium   138   (136-145)  mmol/L


 


Potassium   4.9   (3.5-5.1)  mmol/L


 


Chloride   105   ()  mmol/L


 


Carbon Dioxide   24.5   (21.0-32.0)  mmol/L


 


BUN   25 H   (7.0-18.0)  mg/dL


 


Creatinine   1.4 H   (0.6-1.0)  mg/dL


 


Est Cr Clr Drug Dosing   25.80   mL/min


 


Estimated GFR (MDRD)   36.6   ml/min


 


Glucose   96   ()  mg/dL


 


Calcium   9.6   (8.5-10.1)  mg/dL


 


Total Bilirubin   0.5   (0.2-1.0)  mg/dL


 


AST   46 H   (15-37)  IU/L


 


ALT   27   (14-63)  IU/L


 


Alkaline Phosphatase   51   ()  U/L


 


Creatine Kinase     ()  U/L


 


Troponin I   0.439 H*   (0.000-0.056)  ng/mL


 


Total Protein   6.9   (6.4-8.2)  g/dL


 


Albumin   3.5   (3.4-5.0)  g/dL


 


Globulin   3.4   (2.6-4.0)  g/dL


 


Albumin/Globulin Ratio   1.0   (0.9-1.6)  














  06/24/19 Range/Units





  17:00 


 


WBC   (4.0-11.0)  K/uL


 


RBC   (4.30-5.90)  M/uL


 


Hgb   (12.0-16.0)  g/dL


 


Hct   (36.0-46.0)  %


 


MCV   (80.0-98.0)  fL


 


MCH   (27.0-32.0)  pg


 


MCHC   (31.0-37.0)  g/dL


 


RDW Std Deviation   (28.0-62.0)  fl


 


RDW Coeff of Charlee   (11.0-15.0)  %


 


Plt Count   (150-400)  K/uL


 


MPV   (7.40-12.00)  fL


 


Neut % (Auto)   (48.0-80.0)  %


 


Lymph % (Auto)   (16.0-40.0)  %


 


Mono % (Auto)   (0.0-15.0)  %


 


Eos % (Auto)   (0.0-7.0)  %


 


Baso % (Auto)   (0.0-1.5)  %


 


Neut # (Auto)   (1.4-5.7)  K/uL


 


Lymph # (Auto)   (0.6-2.4)  K/uL


 


Mono # (Auto)   (0.0-0.8)  K/uL


 


Eos # (Auto)   (0.0-0.7)  K/uL


 


Baso # (Auto)   (0.0-0.1)  K/uL


 


Nucleated RBC %   /100WBC


 


Nucleated RBCs #   K/uL


 


INR   


 


Sodium   (136-145)  mmol/L


 


Potassium   (3.5-5.1)  mmol/L


 


Chloride   ()  mmol/L


 


Carbon Dioxide   (21.0-32.0)  mmol/L


 


BUN   (7.0-18.0)  mg/dL


 


Creatinine   (0.6-1.0)  mg/dL


 


Est Cr Clr Drug Dosing   mL/min


 


Estimated GFR (MDRD)   ml/min


 


Glucose   ()  mg/dL


 


Calcium   (8.5-10.1)  mg/dL


 


Total Bilirubin   (0.2-1.0)  mg/dL


 


AST   (15-37)  IU/L


 


ALT   (14-63)  IU/L


 


Alkaline Phosphatase   ()  U/L


 


Creatine Kinase  46  ()  U/L


 


Troponin I   (0.000-0.056)  ng/mL


 


Total Protein   (6.4-8.2)  g/dL


 


Albumin   (3.4-5.0)  g/dL


 


Globulin   (2.6-4.0)  g/dL


 


Albumin/Globulin Ratio   (0.9-1.6)  











Meds: 


Medications











Generic Name Dose Route Start Last Admin





  Trade Name Freq  PRN Reason Stop Dose Admin


 


Sodium Chloride  1,000 mls @ 100 mls/hr  06/24/19 18:23  06/24/19 18:51





  Normal Saline  IV  06/25/19 04:22  100 mls/hr





  STAT ONE   Administration





     





     





     





     














Discontinued Medications














Generic Name Dose Route Start Last Admin





  Trade Name Megan  PRN Reason Stop Dose Admin


 


Morphine Sulfate  2 mg  06/24/19 18:25  06/24/19 18:50





  Morphine  IVPUSH  06/24/19 18:26  2 mg





  ONETIME ONE   Administration





     





     





     





     


 


Ondansetron HCl  4 mg  06/24/19 18:26  06/24/19 18:52





  Zofran  IVPUSH  06/24/19 18:27  4 mg





  ONETIME ONE   Administration





     





     





     





     














Departure





- Departure


Time of Disposition: 19:06


Disposition: Refer to Observation


Clinical Impression: 


 Dizziness, Elevated troponin





Lateral malleolar fracture


Qualifiers:


 Encounter type: initial encounter Fracture type: closed Fracture alignment: 

displaced Laterality: right Qualified Code(s): S82.61XA - Displaced fracture of 

lateral malleolus of right fibula, initial encounter for closed fracture








- Discharge Information


Referrals: 


PCP,None [Primary Care Provider] - 


Forms:  ED Department Discharge





- My Orders


Last 24 Hours: 


My Active Orders





06/24/19 16:26


EKG Documentation Completion [RC] STAT 





06/24/19 18:03


DME for Discharge [COMM] Stat 





06/24/19 18:23


Sodium Chloride 0.9% [Normal Saline] 1,000 ml IV STAT 














- Assessment/Plan


Last 24 Hours: 


My Active Orders





06/24/19 16:26


EKG Documentation Completion [RC] STAT 





06/24/19 18:03


DME for Discharge [COMM] Stat 





06/24/19 18:23


Sodium Chloride 0.9% [Normal Saline] 1,000 ml IV STAT

## 2019-06-24 NOTE — CR
Indication:



Fall, pain and bruising. 



Technique:



Right ankle 3 views 



Comparison:



None



Findings:



Bones: Osteopenia within oblique lateral malleolar fracture with 2 

millimeters lateral displacement of the distal fracture fragment. 



Joint spaces: Joint spaces are well maintained. No degenerative changes.  



Soft tissues: Prominent lateral soft tissue swelling. Calcification distal 

Achilles tendon. 



Impression:



Lateral soft tissue swelling with oblique minimally displaced lateral 

malleolar fracture.



Dictated by Martin Carrera MD @ Jun 24 2019  6:29PM



Signed by Dr. Martin Carrera @ Jun 24 2019  6:30PM

## 2019-06-25 LAB
CHLORIDE SERPL-SCNC: 108 MMOL/L (ref 98–107)
SODIUM SERPL-SCNC: 139 MMOL/L (ref 136–145)

## 2019-06-25 NOTE — [id number]
**Discharge Summary





- Hospital Course


Brief History: 77 yo female with pmh of atrial fibrillation on anticoagulation 

who self caths who presented to the ED with three day history of right ankle 

pain.  She reported the pain started after she fell in the kitchen from a 

syncopal event.  Patient does report ocassional dizzy spells but has only 

passed out one other time in her life.  IN the ED she was noted to have an 

oblique minimally displace lateral maleolar fracture.  Her troponin was noted 

to be mildly elevated.  She denies any chest pain, shortness of breath, or 

palpitations.


Diagnosis: Stroke: No





- Discharge Data


Discharge Date: 06/25/19


Discharge Disposition: DC/Tfer to Acute Hospital 02


Condition: Stable





- Discharge Diagnosis/Problem(s)


(1) Lateral malleolar fracture


SNOMED Code(s): 741409471


   ICD Code: S82.63XA - DISP FX OF LATERAL MALLEOLUS OF UNSP FIBULA, INIT   

Status: Acute   


Qualifiers: 


   Encounter type: initial encounter   Fracture type: closed   Fracture 

alignment: displaced   Laterality: right   Qualified Code(s): S82.61XA - 

Displaced fracture of lateral malleolus of right fibula, initial encounter for 

closed fracture   





(2) Dizziness


SNOMED Code(s): 780457873, 076455929


   ICD Code: R42 - DIZZINESS AND GIDDINESS   Status: Acute   





(3) Elevated troponin


SNOMED Code(s): 382161859, 411067091, 980375461


   ICD Code: R74.8 - ABNORMAL LEVELS OF OTHER SERUM ENZYMES   Status: Acute   





(4) Anemia


SNOMED Code(s): 070964328


   ICD Code: D64.9 - ANEMIA, UNSPECIFIED   Status: Chronic   





(5) CKD (chronic kidney disease)


SNOMED Code(s): 997205223


   ICD Code: N18.9 - CHRONIC KIDNEY DISEASE, UNSPECIFIED   Status: Chronic   





(6) Chronic deep vein thrombosis (DVT)


SNOMED Code(s): 25190782085514139


   ICD Code: I82.509 - CHRONIC EMBOLISM AND THOMBOS UNSP DEEP VN UNSP LOW EXTRM

   Status: Chronic   





(7) Afib


SNOMED Code(s): 17154841


   ICD Code: I48.91 - UNSPECIFIED ATRIAL FIBRILLATION   Status: Chronic   





(8) Chronic anticoagulation


SNOMED Code(s): 039949195


   ICD Code: Z79.01 - LONG TERM (CURRENT) USE OF ANTICOAGULANTS   Status: 

Chronic   





(9) HTN (hypertension)


SNOMED Code(s): 76938511


   ICD Code: I10 - ESSENTIAL (PRIMARY) HYPERTENSION   Status: Chronic   


Qualifiers: 


   Hypertension type: essential hypertension   Qualified Code(s): I10 - 

Essential (primary) hypertension   





(10) Urinary retention


SNOMED Code(s): 915731886


   ICD Code: R33.9 - RETENTION OF URINE, UNSPECIFIED   Status: Chronic   





- Patient Summary/Data


Consults: 


 Consultations





06/25/19 09:37


PT Evaluation and Treatment [CONS] Routine 














- Discharge Plan


Home Medications: 


 Home Meds





Chlorthalidone 25 mg PO BEDTIME 04/16/19 [History]


Fenofibrate 145 mg PO BEDTIME 04/16/19 [History]


Metoprolol Succinate [Kapspargo Sprinkle] 25 mg PO BEDTIME 04/16/19 [History]


Ramipril [Altace] 5 mg PO BEDTIME 04/16/19 [History]


Rosuvastatin Calcium 20 mg PO BEDTIME 04/16/19 [History]


Apixaban [Eliquis] 2.5 mg PO BEDTIME 06/24/19 [History]


Aspirin [Guilherme Chewable Aspirin] 162 mg PO BEDTIME 06/24/19 [History]


Iron Ps Cmplx/Vit B12/Fa [Poly-Iron 150 Forte] 1 each PO BEDTIME 06/24/19 [

History]


Melatonin 5 mg PO BEDTIME 06/24/19 [History]


Metaxalone [Metaxall] 800 mg PO BEDTIME 06/24/19 [History]


Vit C/E/Zn/Coppr/Lutein/Zeaxan [Preservision Areds 2 Softgel] 1 each PO BEDTIME 

06/24/19 [History]








Referrals: 


PCP,None [Primary Care Provider] - 





- Discharge Summary/Plan Comment


DC Time >30 min.: No


Discharge Summary/Plan Comment: 





Admitting Diagnoses:





Fall


syncope


Ankle fx


Elevated troponin








Discharge diagnoses:





Complete heart block











Ruth was admitted secondary to fall syncope and ankle fracture. She was 

noted to have elevated troponin, but no chest pain or EKG changes. Today she 

was doing well, noted to be orthostatic with mild AUGUSTINA. She was given IVFs. She 

then developed complete heart block on telemetry and was noted to be lightheaded

, dizzy and didn't feel well. Repeat troponin . 28. I spoke with Dr Hook, cardiology. He review EKG and telemetry and recommended 

transferring her for possible pacemaker. Holding beta blocker. I spoke with Dr Jerez, ED in [place] who kindly accepted her for transfer. EMS with ACLS wait 

would have been over 6 hours and patient needs to be evaluated urgently by 

cardiology. She will be taken to [place] via Helicopter. She agrees with 

treatment plan. Son at bedside who will go to [place] tomorrow. 





She has follow up with Orthopedics regarding ankle fracture, who recommended 

cam boot and non weight bearing.  





- General Info


Date of Service: 06/25/19


Admission Dx/Problem (Free Text: 


 Admission Diagnosis/Problem





Admission Diagnosis/Problem      Elevated troponin I level











- Review of Systems


Cardiovascular: Reports: Lightheadedness (over all not feeling well. ).  Denies

: Chest Pain


Gastrointestinal: Reports: No Symptoms


Genitourinary: Reports: No Symptoms


Musculoskeletal: Reports: No Symptoms


Skin: Reports: No Symptoms


Neurological: Reports: No Symptoms


Psychiatric: Reports: No Symptoms





- Patient Data


Vitals - Most Recent: 


 Last Vital Signs











Temp  97.3 F   06/25/19 07:00


 


Pulse  65   06/25/19 07:00


 


Resp  20   06/25/19 07:00


 


BP  123/54 L  06/25/19 07:57


 


Pulse Ox  96   06/25/19 07:00








 





Orthostatic Blood Pressure [     119/49


Standing]                        


Orthostatic Blood Pressure [     134/49


Sitting]                         


Orthostatic Blood Pressure [     123/54


Supine]                          








Weight - Most Recent: 61.433 kg


I&O - Last 24 hours: 


 Intake & Output











 06/24/19 06/25/19 06/25/19





 22:59 06:59 14:59


 


Intake Total  1200 


 


Output Total  400 


 


Balance  800 











Lab Results - Last 24 hrs: 


 Laboratory Results - last 24 hr











  06/24/19 06/24/19 06/24/19 Range/Units





  17:00 17:00 17:00 


 


WBC  4.70    (4.0-11.0)  K/uL


 


RBC  3.61 L    (4.30-5.90)  M/uL


 


Hgb  11.1 L    (12.0-16.0)  g/dL


 


Hct  34.7 L    (36.0-46.0)  %


 


MCV  96.1    (80.0-98.0)  fL


 


MCH  30.7    (27.0-32.0)  pg


 


MCHC  32.0    (31.0-37.0)  g/dL


 


RDW Std Deviation  49.1    (28.0-62.0)  fl


 


RDW Coeff of Charlee  14    (11.0-15.0)  %


 


Plt Count  343    (150-400)  K/uL


 


MPV  10.40    (7.40-12.00)  fL


 


Neut % (Auto)  56.9    (48.0-80.0)  %


 


Lymph % (Auto)  24.9    (16.0-40.0)  %


 


Mono % (Auto)  13.6    (0.0-15.0)  %


 


Eos % (Auto)  4.0    (0.0-7.0)  %


 


Baso % (Auto)  0.6    (0.0-1.5)  %


 


Neut # (Auto)  2.7    (1.4-5.7)  K/uL


 


Lymph # (Auto)  1.2    (0.6-2.4)  K/uL


 


Mono # (Auto)  0.6    (0.0-0.8)  K/uL


 


Eos # (Auto)  0.2    (0.0-0.7)  K/uL


 


Baso # (Auto)  0.0    (0.0-0.1)  K/uL


 


Nucleated RBC %  0.0    /100WBC


 


Nucleated RBCs #  0    K/uL


 


INR    1.08  


 


Sodium   138   (136-145)  mmol/L


 


Potassium   4.9   (3.5-5.1)  mmol/L


 


Chloride   105   ()  mmol/L


 


Carbon Dioxide   24.5   (21.0-32.0)  mmol/L


 


BUN   25 H   (7.0-18.0)  mg/dL


 


Creatinine   1.4 H   (0.6-1.0)  mg/dL


 


Est Cr Clr Drug Dosing   25.80   mL/min


 


Estimated GFR (MDRD)   36.6   ml/min


 


Glucose   96   ()  mg/dL


 


Calcium   9.6   (8.5-10.1)  mg/dL


 


Total Bilirubin   0.5   (0.2-1.0)  mg/dL


 


AST   46 H   (15-37)  IU/L


 


ALT   27   (14-63)  IU/L


 


Alkaline Phosphatase   51   ()  U/L


 


Creatine Kinase     ()  U/L


 


Troponin I   0.439 H*   (0.000-0.056)  ng/mL


 


Total Protein   6.9   (6.4-8.2)  g/dL


 


Albumin   3.5   (3.4-5.0)  g/dL


 


Globulin   3.4   (2.6-4.0)  g/dL


 


Albumin/Globulin Ratio   1.0   (0.9-1.6)  














  06/24/19 06/24/19 06/25/19 Range/Units





  17:00 22:45 05:08 


 


WBC     (4.0-11.0)  K/uL


 


RBC     (4.30-5.90)  M/uL


 


Hgb     (12.0-16.0)  g/dL


 


Hct     (36.0-46.0)  %


 


MCV     (80.0-98.0)  fL


 


MCH     (27.0-32.0)  pg


 


MCHC     (31.0-37.0)  g/dL


 


RDW Std Deviation     (28.0-62.0)  fl


 


RDW Coeff of Charlee     (11.0-15.0)  %


 


Plt Count     (150-400)  K/uL


 


MPV     (7.40-12.00)  fL


 


Neut % (Auto)     (48.0-80.0)  %


 


Lymph % (Auto)     (16.0-40.0)  %


 


Mono % (Auto)     (0.0-15.0)  %


 


Eos % (Auto)     (0.0-7.0)  %


 


Baso % (Auto)     (0.0-1.5)  %


 


Neut # (Auto)     (1.4-5.7)  K/uL


 


Lymph # (Auto)     (0.6-2.4)  K/uL


 


Mono # (Auto)     (0.0-0.8)  K/uL


 


Eos # (Auto)     (0.0-0.7)  K/uL


 


Baso # (Auto)     (0.0-0.1)  K/uL


 


Nucleated RBC %     /100WBC


 


Nucleated RBCs #     K/uL


 


INR     


 


Sodium    139  (136-145)  mmol/L


 


Potassium    4.6  (3.5-5.1)  mmol/L


 


Chloride    108 H  ()  mmol/L


 


Carbon Dioxide    23.2  (21.0-32.0)  mmol/L


 


BUN    26 H  (7.0-18.0)  mg/dL


 


Creatinine    1.7 H  (0.6-1.0)  mg/dL


 


Est Cr Clr Drug Dosing    21.24  mL/min


 


Estimated GFR (MDRD)    29.2  ml/min


 


Glucose    109 H  ()  mg/dL


 


Calcium    8.6  (8.5-10.1)  mg/dL


 


Total Bilirubin     (0.2-1.0)  mg/dL


 


AST     (15-37)  IU/L


 


ALT     (14-63)  IU/L


 


Alkaline Phosphatase     ()  U/L


 


Creatine Kinase  46    ()  U/L


 


Troponin I   0.372 H*  0.336 H*  (0.000-0.056)  ng/mL


 


Total Protein     (6.4-8.2)  g/dL


 


Albumin     (3.4-5.0)  g/dL


 


Globulin     (2.6-4.0)  g/dL


 


Albumin/Globulin Ratio     (0.9-1.6)  














  06/25/19 06/25/19 Range/Units





  05:08 12:15 


 


WBC  3.97 L   (4.0-11.0)  K/uL


 


RBC  3.01 L   (4.30-5.90)  M/uL


 


Hgb  9.1 L   (12.0-16.0)  g/dL


 


Hct  28.8 L   (36.0-46.0)  %


 


MCV  95.7   (80.0-98.0)  fL


 


MCH  30.2   (27.0-32.0)  pg


 


MCHC  31.6   (31.0-37.0)  g/dL


 


RDW Std Deviation  47.5   (28.0-62.0)  fl


 


RDW Coeff of Charlee  14   (11.0-15.0)  %


 


Plt Count  253   (150-400)  K/uL


 


MPV  10.00   (7.40-12.00)  fL


 


Neut % (Auto)  51.3   (48.0-80.0)  %


 


Lymph % (Auto)  28.0   (16.0-40.0)  %


 


Mono % (Auto)  14.1   (0.0-15.0)  %


 


Eos % (Auto)  5.8   (0.0-7.0)  %


 


Baso % (Auto)  0.8   (0.0-1.5)  %


 


Neut # (Auto)  2.0   (1.4-5.7)  K/uL


 


Lymph # (Auto)  1.1   (0.6-2.4)  K/uL


 


Mono # (Auto)  0.6   (0.0-0.8)  K/uL


 


Eos # (Auto)  0.2   (0.0-0.7)  K/uL


 


Baso # (Auto)  0.0   (0.0-0.1)  K/uL


 


Nucleated RBC %  0.0   /100WBC


 


Nucleated RBCs #  0   K/uL


 


INR    


 


Sodium    (136-145)  mmol/L


 


Potassium    (3.5-5.1)  mmol/L


 


Chloride    ()  mmol/L


 


Carbon Dioxide    (21.0-32.0)  mmol/L


 


BUN    (7.0-18.0)  mg/dL


 


Creatinine    (0.6-1.0)  mg/dL


 


Est Cr Clr Drug Dosing    mL/min


 


Estimated GFR (MDRD)    ml/min


 


Glucose    ()  mg/dL


 


Calcium    (8.5-10.1)  mg/dL


 


Total Bilirubin    (0.2-1.0)  mg/dL


 


AST    (15-37)  IU/L


 


ALT    (14-63)  IU/L


 


Alkaline Phosphatase    ()  U/L


 


Creatine Kinase    ()  U/L


 


Troponin I   0.285 H*  (0.000-0.056)  ng/mL


 


Total Protein    (6.4-8.2)  g/dL


 


Albumin    (3.4-5.0)  g/dL


 


Globulin    (2.6-4.0)  g/dL


 


Albumin/Globulin Ratio    (0.9-1.6)  











Med Orders - Current: 


 Current Medications





Aspirin (Aspirin)  162 mg PO BEDTIME BALDO


Sodium Chloride (Normal Saline)  500 mls @ 200 mls/hr IV .BOLUS BALDO


Melatonin (Melatonin)  6 mg PO BEDTIME BALDO


   Last Admin: 06/24/19 23:44 Dose:  6 mg


Metoprolol Succinate (Toprol Xl)  25 mg PO BEDTIME BALDO


(Fenofibrate [ Fenofibrate] 145 Mg) ***Own Med***  145 mg PO BEDTIME BALDO


   Last Admin: 06/24/19 23:35 Dose:  145 mg


Oxycodone HCl (Oxycodone)  5 mg PO Q4H PRN


   PRN Reason: Pain


   Last Admin: 06/25/19 03:14 Dose:  5 mg


Rosuvastatin Calcium (Crestor)  20 mg PO BEDTIME BALDO





Discontinued Medications





Apixaban (Eliquis)  2.5 mg PO BEDTIME BALDO


Apixaban (Eliquis)  2.5 mg PO ONETIME ONE


   Stop: 06/24/19 23:16


   Last Admin: 06/24/19 23:43 Dose:  2.5 mg


Aspirin (Aspirin)  162 mg PO ONETIME ONE


   Stop: 06/24/19 23:16


   Last Admin: 06/24/19 23:44 Dose:  162 mg


Chlorthalidone (Chlorthalidone)  25 mg PO BEDTIME BALDO


Chlorthalidone (Chlorthalidone)  25 mg PO ONETIME ONE


   Stop: 06/24/19 23:16


   Last Admin: 06/24/19 23:36 Dose:  25 mg


Sodium Chloride (Normal Saline)  1,000 mls @ 100 mls/hr IV STAT ONE


   Stop: 06/25/19 04:22


   Last Admin: 06/25/19 10:41 Dose:  100 mls/hr


Sodium Chloride (Normal Saline) Confirm Administered Dose 1,000 mls @ as 

directed .ROUTE .STK-MED ONE


   Stop: 06/25/19 10:40


   Last Admin: 06/25/19 13:10 Dose:  Not Given


Metoprolol Succinate (Toprol Xl)  25 mg PO ONETIME ONE


   Stop: 06/24/19 23:16


   Last Admin: 06/24/19 23:37 Dose:  25 mg


Morphine Sulfate (Morphine)  2 mg IVPUSH ONETIME ONE


   Stop: 06/24/19 18:26


   Last Admin: 06/24/19 18:50 Dose:  2 mg


Ondansetron HCl (Zofran)  4 mg IVPUSH ONETIME ONE


   Stop: 06/24/19 18:27


   Last Admin: 06/24/19 18:52 Dose:  4 mg


Ramipril (Altace)  5 mg PO BEDTIME BALDO


Ramipril (Altace)  5 mg PO ONETIME ONE


   Stop: 06/24/19 23:16


   Last Admin: 06/24/19 23:48 Dose:  5 mg


Rosuvastatin Calcium (Crestor)  20 mg PO ONETIME ONE


   Stop: 06/24/19 23:16


   Last Admin: 06/24/19 23:37 Dose:  20 mg











- Exam


General: Reports: Alert, Oriented, Cooperative


Lungs: Reports: Clear to Auscultation, Normal Respiratory Effort


Cardiovascular: Reports: Regular Rate, Regular Rhythm


GI/Abdominal Exam: Normal Bowel Sounds


Back Exam: Reports: Normal Inspection, Full Range of Motion


Extremities: Normal Inspection, Normal Range of Motion, Non-Tender, No Pedal 

Edema


Psy/Mental Status: Reports: Alert, Normal Affect, Normal Mood

## 2019-06-25 NOTE — PCM.PN
- General Info


Date of Service: 06/25/19


Admission Dx/Problem (Free Text): 


 Admission Diagnosis/Problem





Admission Diagnosis/Problem      Elevated troponin I level, R ankle fx











Subjective Update: 





Feeling dizzy this morning with sitting up, reports this is how she feels prior 

to falling. NO chest pain or SOB. Some ankle soreness.


Functional Status: Reports: Pain Controlled, Tolerating Diet





- Review of Systems


General: Reports: No Symptoms.  Denies: Weakness, Malaise


HEENT: Reports: Other (vertigo with sitting up)


Pulmonary: Reports: No Symptoms.  Denies: Shortness of Breath


Cardiovascular: Reports: No Symptoms.  Denies: Chest Pain


Gastrointestinal: Reports: No Symptoms.  Denies: Abdominal Pain, Nausea, 

Vomiting


Genitourinary: Reports: No Symptoms, Retention (baseline, straight caths).  

Denies: Dysuria, Frequency


Musculoskeletal: Reports: Joint Pain (R ankle soreness)


Skin: Reports: No Symptoms


Neurological: Reports: No Symptoms


Psychiatric: Reports: No Symptoms





- Patient Data


Vitals - Most Recent: 


 Last Vital Signs











Temp  97.3 F   06/25/19 07:00


 


Pulse  65   06/25/19 07:00


 


Resp  20   06/25/19 07:00


 


BP  123/54 L  06/25/19 07:57


 


Pulse Ox  96   06/25/19 07:00











Weight - Most Recent: 61.433 kg


I&O - Last 24 Hours: 


 Intake & Output











 06/24/19 06/25/19 06/25/19





 22:59 06:59 14:59


 


Intake Total  1200 


 


Output Total  400 


 


Balance  800 











Lab Results Last 24 Hours: 


 Laboratory Results - last 24 hr











  06/24/19 06/24/19 06/24/19 Range/Units





  17:00 17:00 17:00 


 


WBC  4.70    (4.0-11.0)  K/uL


 


RBC  3.61 L    (4.30-5.90)  M/uL


 


Hgb  11.1 L    (12.0-16.0)  g/dL


 


Hct  34.7 L    (36.0-46.0)  %


 


MCV  96.1    (80.0-98.0)  fL


 


MCH  30.7    (27.0-32.0)  pg


 


MCHC  32.0    (31.0-37.0)  g/dL


 


RDW Std Deviation  49.1    (28.0-62.0)  fl


 


RDW Coeff of Charlee  14    (11.0-15.0)  %


 


Plt Count  343    (150-400)  K/uL


 


MPV  10.40    (7.40-12.00)  fL


 


Neut % (Auto)  56.9    (48.0-80.0)  %


 


Lymph % (Auto)  24.9    (16.0-40.0)  %


 


Mono % (Auto)  13.6    (0.0-15.0)  %


 


Eos % (Auto)  4.0    (0.0-7.0)  %


 


Baso % (Auto)  0.6    (0.0-1.5)  %


 


Neut # (Auto)  2.7    (1.4-5.7)  K/uL


 


Lymph # (Auto)  1.2    (0.6-2.4)  K/uL


 


Mono # (Auto)  0.6    (0.0-0.8)  K/uL


 


Eos # (Auto)  0.2    (0.0-0.7)  K/uL


 


Baso # (Auto)  0.0    (0.0-0.1)  K/uL


 


Nucleated RBC %  0.0    /100WBC


 


Nucleated RBCs #  0    K/uL


 


INR    1.08  


 


Sodium   138   (136-145)  mmol/L


 


Potassium   4.9   (3.5-5.1)  mmol/L


 


Chloride   105   ()  mmol/L


 


Carbon Dioxide   24.5   (21.0-32.0)  mmol/L


 


BUN   25 H   (7.0-18.0)  mg/dL


 


Creatinine   1.4 H   (0.6-1.0)  mg/dL


 


Est Cr Clr Drug Dosing   25.80   mL/min


 


Estimated GFR (MDRD)   36.6   ml/min


 


Glucose   96   ()  mg/dL


 


Calcium   9.6   (8.5-10.1)  mg/dL


 


Total Bilirubin   0.5   (0.2-1.0)  mg/dL


 


AST   46 H   (15-37)  IU/L


 


ALT   27   (14-63)  IU/L


 


Alkaline Phosphatase   51   ()  U/L


 


Creatine Kinase     ()  U/L


 


Troponin I   0.439 H*   (0.000-0.056)  ng/mL


 


Total Protein   6.9   (6.4-8.2)  g/dL


 


Albumin   3.5   (3.4-5.0)  g/dL


 


Globulin   3.4   (2.6-4.0)  g/dL


 


Albumin/Globulin Ratio   1.0   (0.9-1.6)  














  06/24/19 06/24/19 06/25/19 Range/Units





  17:00 22:45 05:08 


 


WBC     (4.0-11.0)  K/uL


 


RBC     (4.30-5.90)  M/uL


 


Hgb     (12.0-16.0)  g/dL


 


Hct     (36.0-46.0)  %


 


MCV     (80.0-98.0)  fL


 


MCH     (27.0-32.0)  pg


 


MCHC     (31.0-37.0)  g/dL


 


RDW Std Deviation     (28.0-62.0)  fl


 


RDW Coeff of Charlee     (11.0-15.0)  %


 


Plt Count     (150-400)  K/uL


 


MPV     (7.40-12.00)  fL


 


Neut % (Auto)     (48.0-80.0)  %


 


Lymph % (Auto)     (16.0-40.0)  %


 


Mono % (Auto)     (0.0-15.0)  %


 


Eos % (Auto)     (0.0-7.0)  %


 


Baso % (Auto)     (0.0-1.5)  %


 


Neut # (Auto)     (1.4-5.7)  K/uL


 


Lymph # (Auto)     (0.6-2.4)  K/uL


 


Mono # (Auto)     (0.0-0.8)  K/uL


 


Eos # (Auto)     (0.0-0.7)  K/uL


 


Baso # (Auto)     (0.0-0.1)  K/uL


 


Nucleated RBC %     /100WBC


 


Nucleated RBCs #     K/uL


 


INR     


 


Sodium    139  (136-145)  mmol/L


 


Potassium    4.6  (3.5-5.1)  mmol/L


 


Chloride    108 H  ()  mmol/L


 


Carbon Dioxide    23.2  (21.0-32.0)  mmol/L


 


BUN    26 H  (7.0-18.0)  mg/dL


 


Creatinine    1.7 H  (0.6-1.0)  mg/dL


 


Est Cr Clr Drug Dosing    21.24  mL/min


 


Estimated GFR (MDRD)    29.2  ml/min


 


Glucose    109 H  ()  mg/dL


 


Calcium    8.6  (8.5-10.1)  mg/dL


 


Total Bilirubin     (0.2-1.0)  mg/dL


 


AST     (15-37)  IU/L


 


ALT     (14-63)  IU/L


 


Alkaline Phosphatase     ()  U/L


 


Creatine Kinase  46    ()  U/L


 


Troponin I   0.372 H*  0.336 H*  (0.000-0.056)  ng/mL


 


Total Protein     (6.4-8.2)  g/dL


 


Albumin     (3.4-5.0)  g/dL


 


Globulin     (2.6-4.0)  g/dL


 


Albumin/Globulin Ratio     (0.9-1.6)  














  06/25/19 Range/Units





  05:08 


 


WBC  3.97 L  (4.0-11.0)  K/uL


 


RBC  3.01 L  (4.30-5.90)  M/uL


 


Hgb  9.1 L  (12.0-16.0)  g/dL


 


Hct  28.8 L  (36.0-46.0)  %


 


MCV  95.7  (80.0-98.0)  fL


 


MCH  30.2  (27.0-32.0)  pg


 


MCHC  31.6  (31.0-37.0)  g/dL


 


RDW Std Deviation  47.5  (28.0-62.0)  fl


 


RDW Coeff of Charlee  14  (11.0-15.0)  %


 


Plt Count  253  (150-400)  K/uL


 


MPV  10.00  (7.40-12.00)  fL


 


Neut % (Auto)  51.3  (48.0-80.0)  %


 


Lymph % (Auto)  28.0  (16.0-40.0)  %


 


Mono % (Auto)  14.1  (0.0-15.0)  %


 


Eos % (Auto)  5.8  (0.0-7.0)  %


 


Baso % (Auto)  0.8  (0.0-1.5)  %


 


Neut # (Auto)  2.0  (1.4-5.7)  K/uL


 


Lymph # (Auto)  1.1  (0.6-2.4)  K/uL


 


Mono # (Auto)  0.6  (0.0-0.8)  K/uL


 


Eos # (Auto)  0.2  (0.0-0.7)  K/uL


 


Baso # (Auto)  0.0  (0.0-0.1)  K/uL


 


Nucleated RBC %  0.0  /100WBC


 


Nucleated RBCs #  0  K/uL


 


INR   


 


Sodium   (136-145)  mmol/L


 


Potassium   (3.5-5.1)  mmol/L


 


Chloride   ()  mmol/L


 


Carbon Dioxide   (21.0-32.0)  mmol/L


 


BUN   (7.0-18.0)  mg/dL


 


Creatinine   (0.6-1.0)  mg/dL


 


Est Cr Clr Drug Dosing   mL/min


 


Estimated GFR (MDRD)   ml/min


 


Glucose   ()  mg/dL


 


Calcium   (8.5-10.1)  mg/dL


 


Total Bilirubin   (0.2-1.0)  mg/dL


 


AST   (15-37)  IU/L


 


ALT   (14-63)  IU/L


 


Alkaline Phosphatase   ()  U/L


 


Creatine Kinase   ()  U/L


 


Troponin I   (0.000-0.056)  ng/mL


 


Total Protein   (6.4-8.2)  g/dL


 


Albumin   (3.4-5.0)  g/dL


 


Globulin   (2.6-4.0)  g/dL


 


Albumin/Globulin Ratio   (0.9-1.6)  











Med Orders - Current: 


 Current Medications





Apixaban (Eliquis)  2.5 mg PO BEDTIME BALDO


Aspirin (Aspirin)  162 mg PO BEDTIME BALDO


Sodium Chloride (Normal Saline)  500 mls @ 200 mls/hr IV .BOLUS BALDO


Melatonin (Melatonin)  6 mg PO BEDTIME BALDO


   Last Admin: 06/24/19 23:44 Dose:  6 mg


Metoprolol Succinate (Toprol Xl)  25 mg PO BEDTIME BALDO


(Fenofibrate [ Fenofibrate] 145 Mg) ***Own Med***  145 mg PO BEDTIME BALDO


   Last Admin: 06/24/19 23:35 Dose:  145 mg


Oxycodone HCl (Oxycodone)  5 mg PO Q4H PRN


   PRN Reason: Pain


   Last Admin: 06/25/19 03:14 Dose:  5 mg


Ramipril (Altace)  5 mg PO BEDTIME BALDO


Rosuvastatin Calcium (Crestor)  20 mg PO BEDTIME BALDO





Discontinued Medications





Apixaban (Eliquis)  2.5 mg PO ONETIME ONE


   Stop: 06/24/19 23:16


   Last Admin: 06/24/19 23:43 Dose:  2.5 mg


Aspirin (Aspirin)  162 mg PO ONETIME ONE


   Stop: 06/24/19 23:16


   Last Admin: 06/24/19 23:44 Dose:  162 mg


Chlorthalidone (Chlorthalidone)  25 mg PO BEDTIME BALDO


Chlorthalidone (Chlorthalidone)  25 mg PO ONETIME ONE


   Stop: 06/24/19 23:16


   Last Admin: 06/24/19 23:36 Dose:  25 mg


Sodium Chloride (Normal Saline)  1,000 mls @ 100 mls/hr IV STAT ONE


   Stop: 06/25/19 04:22


   Last Admin: 06/24/19 18:51 Dose:  100 mls/hr


Metoprolol Succinate (Toprol Xl)  25 mg PO ONETIME ONE


   Stop: 06/24/19 23:16


   Last Admin: 06/24/19 23:37 Dose:  25 mg


Morphine Sulfate (Morphine)  2 mg IVPUSH ONETIME ONE


   Stop: 06/24/19 18:26


   Last Admin: 06/24/19 18:50 Dose:  2 mg


Ondansetron HCl (Zofran)  4 mg IVPUSH ONETIME ONE


   Stop: 06/24/19 18:27


   Last Admin: 06/24/19 18:52 Dose:  4 mg


Ramipril (Altace)  5 mg PO ONETIME ONE


   Stop: 06/24/19 23:16


   Last Admin: 06/24/19 23:48 Dose:  5 mg


Rosuvastatin Calcium (Crestor)  20 mg PO ONETIME ONE


   Stop: 06/24/19 23:16


   Last Admin: 06/24/19 23:37 Dose:  20 mg











- Exam


General: Alert, Oriented, Cooperative


HEENT: Pupils Equal, Pupils Reactive


Lungs: Clear to Auscultation, Normal Respiratory Effort


Cardiovascular: Regular Rate, Regular Rhythm


GI/Abdominal Exam: Normal Bowel Sounds, Soft, Non-Tender


Extremities: Normal Inspection, Normal Range of Motion, Non-Tender, No Pedal 

Edema, Other (CMS intact to R foot, splint in place.)


Neurological: No New Focal Deficit


Psy/Mental Status: Alert, Normal Affect, Normal Mood





- Problem List & Annotations


(1) Lateral malleolar fracture


SNOMED Code(s): 107006439


   Code(s): S82.63XA - DISP FX OF LATERAL MALLEOLUS OF UNSP FIBULA, INIT   

Status: Acute   Current Visit: Yes   


Qualifiers: 


   Encounter type: initial encounter   Fracture type: closed   Fracture 

alignment: displaced   Laterality: right   Qualified Code(s): S82.61XA - 

Displaced fracture of lateral malleolus of right fibula, initial encounter for 

closed fracture   





(2) Dizziness


SNOMED Code(s): 346896663, 017385829


   Code(s): R42 - DIZZINESS AND GIDDINESS   Status: Acute   Current Visit: Yes 

  





(3) Elevated troponin


SNOMED Code(s): 866982055, 189453872, 737542439


   Code(s): R74.8 - ABNORMAL LEVELS OF OTHER SERUM ENZYMES   Status: Acute   

Current Visit: Yes   





(4) Anemia


SNOMED Code(s): 761969842


   Code(s): D64.9 - ANEMIA, UNSPECIFIED   Status: Chronic   Current Visit: No   





(5) CKD (chronic kidney disease)


SNOMED Code(s): 313042578


   Code(s): N18.9 - CHRONIC KIDNEY DISEASE, UNSPECIFIED   Status: Chronic   

Current Visit: No   





(6) Chronic deep vein thrombosis (DVT)


SNOMED Code(s): 21649039375520675


   Code(s): I82.509 - CHRONIC EMBOLISM AND THOMBOS UNSP DEEP VN UNSP LOW EXTRM 

  Status: Chronic   Current Visit: No   





(7) Afib


SNOMED Code(s): 58285614


   Code(s): I48.91 - UNSPECIFIED ATRIAL FIBRILLATION   Status: Chronic   

Current Visit: No   





(8) Chronic anticoagulation


SNOMED Code(s): 343502942


   Code(s): Z79.01 - LONG TERM (CURRENT) USE OF ANTICOAGULANTS   Status: 

Chronic   Current Visit: No   





(9) HTN (hypertension)


SNOMED Code(s): 32111265


   Code(s): I10 - ESSENTIAL (PRIMARY) HYPERTENSION   Status: Chronic   Current 

Visit: No   


Qualifiers: 


   Hypertension type: essential hypertension   Qualified Code(s): I10 - 

Essential (primary) hypertension   





(10) Urinary retention


SNOMED Code(s): 196849414


   Code(s): R33.9 - RETENTION OF URINE, UNSPECIFIED   Status: Chronic   Current 

Visit: No   





- Problem List Review


Problem List Initiated/Reviewed/Updated: Yes





- My Orders


Last 24 Hours: 


My Active Orders





06/25/19 08:46


Orthostatic Vital Signs [RC] ASDIRECTED 





06/25/19 09:37


PT Evaluation and Treatment [CONS] Routine 





06/25/19 09:45


Sodium Chloride 0.9% [Normal Saline] 500 ml IV .BOLUS 














- Plan


Plan:: 





This 78 year old female admitted with elevated troponin and R ankle fracture





1. Elevated troponin: Spoke with Dr Hook regarding admission. feels 

that is plateaued, likely CKD and she is not having any chest pain. Due to 

dizziness and fall, he would like Zio patch in place prior to discharge and he 

will follow up with her. Also, he recommends stopping Eliquis, he is aware of 

DVT history but is concerned with her falling and chronic anticoagulation and 

has been on 3 months of anticoagulation. Recommends ASA daily. He is ok with 

holding Chlorthalidone for now and he will reassess.





2. R ankle fx: Spoke with Lisa CASH, Orthopedics regarding weight bearing status. 

She reviewed Xrays and spoke with Dr Zamorano. Non weightbearing now with cam boot

, Follow with Orthopedics as outpatient. Consult PT for ambulation





3. Falls, hx syncope: Orthostasis noted with BPs and AUGUSTINA.  Will Hold 

chlorthalidone for now. Given 500 ml bolus and monitor, BP. ZIO path to be 

placed at discharge and Dr Hook will follow this. 





4. CAD, HTN: Continue ASA, Ramipril and Metoprolol.





VTE prophylaxis: On Eliquis, which is stopped now. Monitor.





Dispo: Likely DC in am.

## 2022-01-03 NOTE — EDM.PDOC
ED HPI GENERAL MEDICAL PROBLEM





- General


Chief Complaint: Lower Extremity Injury/Pain


Stated Complaint: LFT LEG NEED CHECKED


Time Seen by Provider: 01/03/22 08:27





- History of Present Illness


INITIAL COMMENTS - FREE TEXT/NARRATIVE: 





CHIEF COMPLAINT(S): "I need an x-ray."








HISTORY OF PRESENT ILLNESS: This is a 81-year-old with a past medical history of

atrial fibrillation on anticoagulation prior history of DVT who comes to the 

emergency department with a chief complaint of "I need an x-ray."  The patient 

states that she presents to the emergency department because she needs an x-ray.

 She states that approximately 3 days ago she started to experience pain and 

leaking from your left medial ankle that started 2 to 3 days ago.  She states 

that she did not injure this area and currently denies any pain currently.  She 

states that she is concerned about a clot.  In addition she is concerned about 

the swelling and would like an x-ray to evaluate the swelling.  She denies any 

other symptoms





 


REVIEW OF SYSTEMS: 





Constitutional: Denies fever, chills.


Eyes: Denies eye pain


Ears, Nose, Mouth, & Throat: Denies earache


Cardiovascular: Denies chest pain


Respiratory: Denies shortness of breath


Gastrointestinal: Denies Nausea, vomiting, diarrhea, hematochezia.


Genitourinary: Denies hematuria


Skin: Positive for swelling and leaking on her left medial ankle


MSK: Denies joint pain


Neurological: Denies blurred vision, numbness, tingling, weakness


Psychiatric: Denies depression








PAST MEDICAL HISTORY: As per history of present illness and as reviewed below 

otherwise noncontributory.





SURGICAL HISTORY: As per history of present illness and as reviewed below 

otherwise noncontributory.





SOCIAL HISTORY: As per history of present illness and as reviewed below 

otherwise noncontributory.





FAMILY HISTORY: As per history of present illness and as reviewed below 

otherwise noncontributory.








EXAMINATION OF ORGAN SYSTEMS/BODY AREAS: 





Constitutional: Blood pressure is 133/67, heart rate 79, respiratory rate 18 

with an oxygen saturation 97% on room air.  Temperature 36.6


General: Well-appearing woman who is in no acute distress


Psychiatric: Appropriate mood and affect.


Eyes: No scleral icterus or conjunctival erythema


ENMT: Moist mucous membranes. No pharyngeal erythema


Cardiovascular: Regular, rate, and rhythm.  No gallops, murmurs, or rubs.  

Bilateral upper extremity and lower extremity pulses are symmetric and intact.  

No obvious peripheral edema.  No JVD.


Respiratory: Lungs clear to auscultation bilaterally.  No wheezes, rales, or 

rhonchi.


Musculoskeletal: Normal range of motion.


Skin: On the patient's medial left ankle there appears to be an area of 

surrounding cellulitis which is warm without any purulent drainage


Neurological:     Alert, GCS 15 distal sensation is intact








MEDICAL DECISION MAKING AND COURSE IN THE ED WITH INTERPRETATION/REVIEW OF 

DIAGNOSTIC STUDIES: This is a 81-year-old woman with a past medical history of 

atrial fibrillation on anticoagulation prior history of DVT who comes to the 

emergency department with concern for DVT who has obvious left medial ankle 

cellulitis.  Given the concern we will obtain a duplex ultrasound given her 

history of DVT.  Patient is currently not experiencing any pain therefore no 

therapeutics will be administered.  We will start the patient on Bactrim for 

cellulitis.





The radiological images were viewed by myself along with reading the report from

the radiologist.


Duplex ultrasound of the left lower extremity does not reveal any evidence of a 

DVT.





After imaging I did discuss outpatient antibiotic treatment.  At this time she 

was given strict return precautions.  The patient was amenable to discharge and 

had no further questions.





DISPOSITION: The patient was discharged home in stable condition. The patient 

will follow up with primary care physician in 3 to 5 days





CONDITION: As fair





PROCEDURES: None





FINAL IMPRESSION(S)/DIAGNOSES: 





1.  Acute left ankle cellulitis





 





Ruddy Romero M.D.





- Related Data


                                    Allergies











Allergy/AdvReac Type Severity Reaction Status Date / Time


 


Latex, Natural Rubber Allergy Intermediate Itching Verified 01/03/22 08:28


 


codeine Allergy  Nausea and Verified 01/03/22 08:28





   Vomiting  











Home Meds: 


                                    Home Meds





Chlorthalidone 25 mg PO BEDTIME 04/16/19 [History]


Fenofibrate 145 mg PO BEDTIME 04/16/19 [History]


Metoprolol Succinate [Kapspargo Sprinkle] 25 mg PO BEDTIME 04/16/19 [History]


Rosuvastatin Calcium 20 mg PO BEDTIME 04/16/19 [History]


ramipriL [Altace] 5 mg PO BEDTIME 04/16/19 [History]


Apixaban [Eliquis] 2.5 mg PO BEDTIME 06/24/19 [History]


Aspirin [Guilherme Chewable Aspirin] 162 mg PO BEDTIME 06/24/19 [History]


Iron Ps Complex/B12/Folic Acid [Poly-Iron 150 Forte] 1 each PO BEDTIME 06/24/19 

[History]


Melatonin 5 mg PO BEDTIME 06/24/19 [History]


Metaxalone [Metaxall] 800 mg PO BEDTIME 06/24/19 [History]


Vit C/E/Zn/Coppr/Lutein/Zeaxan [Preservision Areds 2 Softgel] 1 each PO BEDTIME 

06/24/19 [History]


Sulfamethoxazole/Trimethoprim [Bactrim Ds Tablet] 1 each PO BID #13 tablet 

01/03/22 [Rx]











Past Medical History





- Past Health History


Medical/Surgical History: Denies Medical/Surgical History


HEENT History: Reports: Impaired Vision


Cardiovascular History: Reports: Afib, Blood Clots/VTE/DVT, High Cholesterol, 

Hypertension, MI, Stents, Syncope


Respiratory History: Reports: None


Gastrointestinal History: Reports: None, GERD


Other Gastrointestinal History: ulcers, takes Baking soda


Genitourinary History: Reports: Retention, Urinary


Other Genitourinary History: patient straight caths


OB/GYN History: Reports: None


Other OB/GYN History: uterine cancer, several years ago


Musculoskeletal History: Reports: Back Pain, Chronic, Osteoarthritis


Neurological History: Reports: None


Psychiatric History: Reports: None


Endocrine/Metabolic History: Reports: Diabetes, Type II


Other Endocrine/Metabolic History: patient states had Type II diabetes, but 

resolved when lost weight


Hematologic History: Reports: Anticoagulation Therapy


Immunologic History: Reports: None


Oncologic (Cancer) History: Reports: None


Dermatologic History: Reports: None





- Infectious Disease History


Infectious Disease History: Reports: Chicken Pox, Measles, Mumps





- Past Surgical History


Head Surgeries/Procedures: Reports: None


HEENT Surgical History: Reports: Cataract Surgery, Tonsillectomy


Cardiovascular Surgical History: Reports: Vascular Surgery, Other (See Below)


Other Cardiovascular Surgeries/Procedures: reports peripheral stents


Respiratory Surgical History: Reports: None


GI Surgical History: Reports: Hernia, Abdominal


Female  Surgical History: Reports: Hysterectomy, Salpingo-Oophorectomy


Endocrine Surgical History: Reports: None


Neurological Surgical History: Reports: None


Musculoskeletal Surgical History: Reports: Other (See Below)


Other Musculoskeletal Surgeries/Procedures:: "surgery on my back" "I don't know 

what kind"


Oncologic Surgical History: Reports: None


Dermatological Surgical History: Reports: None





Social & Family History





- Family History


Family Medical History: No Pertinent Family History


Cardiac: Reports: High Cholesterol, MI


Respiratory: Reports: Asthma





- Caffeine Use


Caffeine Use: Reports: Coffee





- Recreational Drug Use


Recreational Drug Use: No





- Living Situation & Occupation


Living situation: Reports: Alone


Occupation: Retired





Review of Systems





- Review of Systems


Review Of Systems: See Below





ED EXAM, GENERAL





- Physical Exam


Exam: See Below





Course





- Vital Signs


Last Recorded V/S: 


                                Last Vital Signs











Temp  36.6 C   01/03/22 10:35


 


Pulse  75   01/03/22 10:35


 


Resp  18   01/03/22 10:35


 


BP  130/72   01/03/22 10:35


 


Pulse Ox  98   01/03/22 10:35














- Orders/Labs/Meds


Meds: 


Medications














Discontinued Medications














Generic Name Dose Route Start Last Admin





  Trade Name Freq  PRN Reason Stop Dose Admin


 


Trimethoprim/Sulfamethoxazole  1 tab  01/03/22 09:02  01/03/22 09:22





  Sulfamethoxazole/Trimethoprim 800-160 Mg Tab  PO  01/03/22 09:03  1 tab





  ONETIME ONE   Administration














Departure





- Departure


Time of Disposition: 10:22


Disposition: Home, Self-Care 01


Condition: Fair


Clinical Impression: 


 Cellulitis








- Discharge Information


*PRESCRIPTION DRUG MONITORING PROGRAM REVIEWED*: No


*COPY OF PRESCRIPTION DRUG MONITORING REPORT IN PATIENT RACHNA: No


Prescriptions: 


Sulfamethoxazole/Trimethoprim [Bactrim Ds Tablet] 1 each PO BID #13 tablet


Instructions:  Cellulitis, Adult


Referrals: 


Valerie Garcia MD [Primary Care Provider] - 


Forms:  ED Department Discharge


Additional Instructions: 


You were evaluated today on an emergent basis.  At this time the ultrasound of 

your leg did not reveal any evidence of any clots.  I do believe the redness and

the drainage is likely secondary to a skin infection.  I recommend you complete 

a course of antibiotics.  I sent Bactrim to the pharmacy ([facility]).  I would like 

you to follow-up with your primary care physician in 3 to 5 days for 

reevaluation.  Please return for any worsening redness or drainage or you feel 

like is not improving.





[facility] - [facility]                                              

 


[address] [place] [id number]                                                             

               


Phone: [phone]                                                           

            


Fax: [phone]    





[address] [address]                                                             

               


Phone: [phone]                                                           

                                


 Fax: [phone]





The patient is informed of any results of their evaluation and diagnostic workup

and all questions are answered. They are given discharge instructions and return

precautions. The patient is stable for discharge.  The patient states they 

understand and agree with the plan and that they will return if their symptoms 

get worse or if they have any new concerns.





The following information is given to patients seen in the emergency department 

who are being discharged to home. This information is to outline your options 

for follow-up care. We provide all patients seen in our emergency department 

with a follow-up referral.





The need for follow-up, as well as the timing and circumstances, are variable 

depending upon the specifics of your emergency department visit.





If you don't have a primary care physician on staff, we will provide you with a 

referral. We always advise you to contact your personal physician following an 

emergency department visit to inform them of the circumstance of the visit and 

for follow-up with them and/or the need for any referrals to a consulting 

specialist.





The emergency department will also refer you to a specialist when appropriate. 

This referral assures that you have the opportunity for follow-up care with a 

specialist. All of these measure are taken in an effort to provide you with 

optimal care, which includes your follow-up.





Under all circumstances we always encourage you to contact your private 

physician who remains a resource for coordinating your care. When calling for 

follow-up care, please make the office aware that this follow-up is from your 

recent emergency room visit. If for any reason you are refused follow-up, please

contact the [facility] Emergency Department

at [phone] and asked to speak to the emergency department charge nurse.











Sepsis Event Note (ED)





- Evaluation


Sepsis Screening Result: No Definite Risk

## 2022-01-03 NOTE — US
INDICATION:



Left lower extremity swelling 



COMPARISON:



None available. 



FINDINGS:



Ultrasound of the venous drainage of the left lower extremity shows no 

evidence of deep venous thrombosis. There is normal antegrade flow from the 

posterior tibial and popliteal veins superiorly through the common femoral 

vein. There is normal augmentation and compressibility of these veins. 



The right common femoral vein is widely patent.



IMPRESSION:



There is no evidence of deep venous thrombosis in the left lower extremity. 

An arterial stent was incidentally noted but not formally evaluated. 

Swelling involving the left calf.



Dictated by Cheng Ayers MD @ 1/3/2022 10:15:48 AM



(Electronically Signed)